# Patient Record
Sex: FEMALE | Race: WHITE | NOT HISPANIC OR LATINO | URBAN - METROPOLITAN AREA
[De-identification: names, ages, dates, MRNs, and addresses within clinical notes are randomized per-mention and may not be internally consistent; named-entity substitution may affect disease eponyms.]

---

## 2020-03-20 ENCOUNTER — EMERGENCY (EMERGENCY)
Facility: HOSPITAL | Age: 45
LOS: 1 days | Discharge: HOME | End: 2020-03-20
Attending: EMERGENCY MEDICINE
Payer: COMMERCIAL

## 2020-03-20 VITALS
SYSTOLIC BLOOD PRESSURE: 141 MMHG | HEIGHT: 60 IN | TEMPERATURE: 98 F | WEIGHT: 203.93 LBS | OXYGEN SATURATION: 96 % | HEART RATE: 86 BPM | RESPIRATION RATE: 18 BRPM | DIASTOLIC BLOOD PRESSURE: 91 MMHG

## 2020-03-20 LAB — RAPID RVP RESULT: SIGNIFICANT CHANGE UP

## 2020-03-20 PROCEDURE — 87486 CHLMYD PNEUM DNA AMP PROBE: CPT

## 2020-03-20 PROCEDURE — 99283 EMERGENCY DEPT VISIT LOW MDM: CPT

## 2020-03-20 PROCEDURE — 87798 DETECT AGENT NOS DNA AMP: CPT

## 2020-03-20 PROCEDURE — U0001: CPT

## 2020-03-20 PROCEDURE — 87633 RESP VIRUS 12-25 TARGETS: CPT

## 2020-03-20 PROCEDURE — 87581 M.PNEUMON DNA AMP PROBE: CPT

## 2020-03-20 PROCEDURE — 99284 EMERGENCY DEPT VISIT MOD MDM: CPT

## 2020-03-20 NOTE — ED STATDOCS - OBJECTIVE STATEMENT
43 y/o F pt with significant PMHx of DM and asthma presents to the ED c/o SOB and occasional cough that began yesterday. Pt states that she may have had positive contact with a coworker with positive COVID. Denies fever, sore throat, runny nose, NVD. 43 y/o F pt with significant PMHx of DM and asthma presents to the ED c/o mild SOB and occasional cough that began yesterday. Pt states that she may have had positive contact with a coworker with positive COVID. Denies fever, sore throat, runny nose, N/V/D. Reports compliance with advair and proventil (last used last night).

## 2020-03-20 NOTE — ED STATDOCS - CLINICAL SUMMARY MEDICAL DECISION MAKING FREE TEXT BOX
COVID R/O RVP and COVID swabs obtained.  Advised home quarantine until test results.  If test positive, requires home quarantine for 14-days.  Anticipatory guidance provided and supportive care recommended. Advised immediate return if worsening symptoms, especially if short of breath.

## 2020-03-20 NOTE — ED ADULT TRIAGE NOTE - CHIEF COMPLAINT QUOTE
pt states was exposed to COVID and is experiencing cough and shortness of breath. breathing spontaneous and unlabored in triage pt speaking clear full sentences

## 2020-03-20 NOTE — ED STATDOCS - NSFOLLOWUPINSTRUCTIONS_ED_ALL_ED_FT
READ ALL ATTACHED INSTRUCTIONS FOR CORONAVIRUS IMMEDIATELY   -You were tested for COVID19 (Coronavirus) during your visit in the Emergency Department.   -Results will take 5-7 days  -Do NOT return to work/school/public areas until your COVID test results as negative.   -SELF QUARANTINE until COVID result is available.   -Avoid contact with others.   -Wash your hands frequently. Disinfect surfaces frequently    SEEK IMMEDIATE MEDICAL CARE IF YOU HAVE ANY OF THE FOLLOWING SYMPTOMS  **If you develop worsening or new symptoms such as shortness of breath, difficulty breathing, chest pain, confusion, severe weakness, or anything concerning to you, please seek immediate medical care or return to the ER .**

## 2020-05-29 PROBLEM — J45.909 UNSPECIFIED ASTHMA, UNCOMPLICATED: Chronic | Status: ACTIVE | Noted: 2020-03-20

## 2020-08-26 ENCOUNTER — APPOINTMENT (OUTPATIENT)
Dept: INTERNAL MEDICINE | Facility: CLINIC | Age: 45
End: 2020-08-26
Payer: COMMERCIAL

## 2020-08-26 ENCOUNTER — NON-APPOINTMENT (OUTPATIENT)
Age: 45
End: 2020-08-26

## 2020-08-26 VITALS
TEMPERATURE: 98.1 F | BODY MASS INDEX: 40.05 KG/M2 | HEART RATE: 95 BPM | DIASTOLIC BLOOD PRESSURE: 90 MMHG | WEIGHT: 204 LBS | HEIGHT: 60 IN | OXYGEN SATURATION: 97 % | SYSTOLIC BLOOD PRESSURE: 120 MMHG

## 2020-08-26 DIAGNOSIS — Z23 ENCOUNTER FOR IMMUNIZATION: ICD-10-CM

## 2020-08-26 DIAGNOSIS — Z87.01 PERSONAL HISTORY OF PNEUMONIA (RECURRENT): ICD-10-CM

## 2020-08-26 DIAGNOSIS — Z86.69 PERSONAL HISTORY OF OTHER DISEASES OF THE NERVOUS SYSTEM AND SENSE ORGANS: ICD-10-CM

## 2020-08-26 DIAGNOSIS — Z11.59 ENCOUNTER FOR SCREENING FOR OTHER VIRAL DISEASES: ICD-10-CM

## 2020-08-26 DIAGNOSIS — Z78.9 OTHER SPECIFIED HEALTH STATUS: ICD-10-CM

## 2020-08-26 DIAGNOSIS — Z82.49 FAMILY HISTORY OF ISCHEMIC HEART DISEASE AND OTHER DISEASES OF THE CIRCULATORY SYSTEM: ICD-10-CM

## 2020-08-26 DIAGNOSIS — Z83.6 FAMILY HISTORY OF OTHER DISEASES OF THE RESPIRATORY SYSTEM: ICD-10-CM

## 2020-08-26 PROCEDURE — 82043 UR ALBUMIN QUANTITATIVE: CPT | Mod: QW

## 2020-08-26 PROCEDURE — 83036 HEMOGLOBIN GLYCOSYLATED A1C: CPT | Mod: QW

## 2020-08-26 PROCEDURE — 99204 OFFICE O/P NEW MOD 45 MIN: CPT | Mod: 25

## 2020-08-26 PROCEDURE — 36415 COLL VENOUS BLD VENIPUNCTURE: CPT

## 2020-08-26 PROCEDURE — 93000 ELECTROCARDIOGRAM COMPLETE: CPT

## 2020-08-26 PROCEDURE — 90686 IIV4 VACC NO PRSV 0.5 ML IM: CPT

## 2020-08-26 PROCEDURE — G0008: CPT

## 2020-08-26 PROCEDURE — 92552 PURE TONE AUDIOMETRY AIR: CPT

## 2020-08-28 LAB
24R-OH-CALCIDIOL SERPL-MCNC: 43.1 PG/ML
ALBUMIN SERPL ELPH-MCNC: 4.1 G/DL
ALBUMIN: 30
ALP BLD-CCNC: 70 U/L
ALT SERPL-CCNC: 14 U/L
ANION GAP SERPL CALC-SCNC: 11 MMOL/L
APPEARANCE: CLEAR
AST SERPL-CCNC: 12 U/L
BASOPHILS # BLD AUTO: 0.06 K/UL
BASOPHILS NFR BLD AUTO: 0.6 %
BILIRUB SERPL-MCNC: 0.3 MG/DL
BILIRUBIN URINE: NEGATIVE
BLOOD URINE: NEGATIVE
BUN SERPL-MCNC: 9 MG/DL
CALCIUM SERPL-MCNC: 9.4 MG/DL
CHLORIDE SERPL-SCNC: 103 MMOL/L
CHOLEST SERPL-MCNC: 158 MG/DL
CHOLEST/HDLC SERPL: 3.4 RATIO
CK SERPL-CCNC: 78 U/L
CO2 SERPL-SCNC: 24 MMOL/L
COLOR: YELLOW
CREAT SERPL-MCNC: 0.5 MG/DL
CREATININE: 200
EOSINOPHIL # BLD AUTO: 0.07 K/UL
EOSINOPHIL NFR BLD AUTO: 0.7 %
ESTIMATED AVERAGE GLUCOSE: 229 MG/DL
GLUCOSE QUALITATIVE U: ABNORMAL
GLUCOSE SERPL-MCNC: 221 MG/DL
HBA1C MFR BLD HPLC: 9.6 %
HCT VFR BLD CALC: 41.9 %
HDLC SERPL-MCNC: 47 MG/DL
HGB BLD-MCNC: 13.1 G/DL
IMM GRANULOCYTES NFR BLD AUTO: 0.2 %
KETONES URINE: NEGATIVE
LDLC SERPL CALC-MCNC: 86 MG/DL
LDLC SERPL DIRECT ASSAY-MCNC: 96 MG/DL
LEUKOCYTE ESTERASE URINE: NEGATIVE
LYMPHOCYTES # BLD AUTO: 3.17 K/UL
LYMPHOCYTES NFR BLD AUTO: 30.1 %
MAN DIFF?: NORMAL
MCHC RBC-ENTMCNC: 28.7 PG
MCHC RBC-ENTMCNC: 31.3 GM/DL
MCV RBC AUTO: 91.9 FL
MICROALBUMIN/CREAT UR TEST STR-RTO: <30
MONOCYTES # BLD AUTO: 0.47 K/UL
MONOCYTES NFR BLD AUTO: 4.5 %
NEUTROPHILS # BLD AUTO: 6.74 K/UL
NEUTROPHILS NFR BLD AUTO: 63.9 %
NITRITE URINE: NEGATIVE
PH URINE: 6
PLATELET # BLD AUTO: 334 K/UL
POTASSIUM SERPL-SCNC: 4.1 MMOL/L
PROT SERPL-MCNC: 6.9 G/DL
PROTEIN URINE: NORMAL
RBC # BLD: 4.56 M/UL
RBC # FLD: 14.1 %
SARS-COV-2 IGG SERPL IA-ACNC: <3.8 AU/ML
SARS-COV-2 IGG SERPL QL IA: NEGATIVE
SODIUM SERPL-SCNC: 138 MMOL/L
SPECIFIC GRAVITY URINE: 1.03
TRIGL SERPL-MCNC: 125 MG/DL
TSH SERPL-ACNC: 0.79 UIU/ML
URATE SERPL-MCNC: 3.4 MG/DL
UROBILINOGEN URINE: NORMAL
WBC # FLD AUTO: 10.53 K/UL

## 2020-09-08 ENCOUNTER — APPOINTMENT (OUTPATIENT)
Dept: INTERNAL MEDICINE | Facility: CLINIC | Age: 45
End: 2020-09-08
Payer: COMMERCIAL

## 2020-09-08 VITALS
DIASTOLIC BLOOD PRESSURE: 84 MMHG | WEIGHT: 206 LBS | TEMPERATURE: 97.8 F | HEIGHT: 60 IN | SYSTOLIC BLOOD PRESSURE: 126 MMHG | BODY MASS INDEX: 40.44 KG/M2 | OXYGEN SATURATION: 98 % | HEART RATE: 86 BPM

## 2020-09-08 PROCEDURE — 99214 OFFICE O/P EST MOD 30 MIN: CPT

## 2020-09-09 ENCOUNTER — EMERGENCY (EMERGENCY)
Facility: HOSPITAL | Age: 45
LOS: 1 days | Discharge: HOME | End: 2020-09-09
Attending: EMERGENCY MEDICINE
Payer: COMMERCIAL

## 2020-09-09 VITALS
RESPIRATION RATE: 16 BRPM | TEMPERATURE: 98 F | HEIGHT: 60 IN | DIASTOLIC BLOOD PRESSURE: 92 MMHG | SYSTOLIC BLOOD PRESSURE: 127 MMHG | WEIGHT: 220.02 LBS | HEART RATE: 89 BPM | OXYGEN SATURATION: 98 %

## 2020-09-09 VITALS
RESPIRATION RATE: 16 BRPM | HEART RATE: 83 BPM | OXYGEN SATURATION: 98 % | SYSTOLIC BLOOD PRESSURE: 142 MMHG | DIASTOLIC BLOOD PRESSURE: 82 MMHG

## 2020-09-09 LAB
ALBUMIN SERPL ELPH-MCNC: 3.6 G/DL — SIGNIFICANT CHANGE UP (ref 3.3–5.2)
ALP SERPL-CCNC: 67 U/L — SIGNIFICANT CHANGE UP (ref 40–120)
ALT FLD-CCNC: 14 U/L — SIGNIFICANT CHANGE UP
ANION GAP SERPL CALC-SCNC: 11 MMOL/L — SIGNIFICANT CHANGE UP (ref 5–17)
ANISOCYTOSIS BLD QL: SLIGHT — SIGNIFICANT CHANGE UP
APTT BLD: 33.8 SEC — SIGNIFICANT CHANGE UP (ref 27.5–35.5)
AST SERPL-CCNC: 22 U/L — SIGNIFICANT CHANGE UP
BASOPHILS # BLD AUTO: 0.04 K/UL — SIGNIFICANT CHANGE UP (ref 0–0.2)
BASOPHILS NFR BLD AUTO: 0.4 % — SIGNIFICANT CHANGE UP (ref 0–2)
BILIRUB SERPL-MCNC: 0.3 MG/DL — LOW (ref 0.4–2)
BUN SERPL-MCNC: 10 MG/DL — SIGNIFICANT CHANGE UP (ref 8–20)
CALCIUM SERPL-MCNC: 9 MG/DL — SIGNIFICANT CHANGE UP (ref 8.6–10.2)
CHLORIDE SERPL-SCNC: 102 MMOL/L — SIGNIFICANT CHANGE UP (ref 98–107)
CO2 SERPL-SCNC: 22 MMOL/L — SIGNIFICANT CHANGE UP (ref 22–29)
CREAT SERPL-MCNC: 0.44 MG/DL — LOW (ref 0.5–1.3)
DACRYOCYTES BLD QL SMEAR: SLIGHT — SIGNIFICANT CHANGE UP
ELLIPTOCYTES BLD QL SMEAR: SLIGHT — SIGNIFICANT CHANGE UP
EOSINOPHIL # BLD AUTO: 0.06 K/UL — SIGNIFICANT CHANGE UP (ref 0–0.5)
EOSINOPHIL NFR BLD AUTO: 0.6 % — SIGNIFICANT CHANGE UP (ref 0–6)
GIANT PLATELETS BLD QL SMEAR: PRESENT — SIGNIFICANT CHANGE UP
GLUCOSE SERPL-MCNC: 216 MG/DL — HIGH (ref 70–99)
HCG SERPL-ACNC: <4 MIU/ML — SIGNIFICANT CHANGE UP
HCT VFR BLD CALC: 36.3 % — SIGNIFICANT CHANGE UP (ref 34.5–45)
HCT VFR BLD CALC: 41 % — SIGNIFICANT CHANGE UP (ref 34.5–45)
HGB BLD-MCNC: 11.9 G/DL — SIGNIFICANT CHANGE UP (ref 11.5–15.5)
HGB BLD-MCNC: 13.4 G/DL — SIGNIFICANT CHANGE UP (ref 11.5–15.5)
HYPOCHROMIA BLD QL: SLIGHT — SIGNIFICANT CHANGE UP
IMM GRANULOCYTES NFR BLD AUTO: 0.2 % — SIGNIFICANT CHANGE UP (ref 0–1.5)
INR BLD: 0.99 RATIO — SIGNIFICANT CHANGE UP (ref 0.88–1.16)
LYMPHOCYTES # BLD AUTO: 2.51 K/UL — SIGNIFICANT CHANGE UP (ref 1–3.3)
LYMPHOCYTES # BLD AUTO: 26.3 % — SIGNIFICANT CHANGE UP (ref 13–44)
MACROCYTES BLD QL: SLIGHT — SIGNIFICANT CHANGE UP
MANUAL SMEAR VERIFICATION: SIGNIFICANT CHANGE UP
MCHC RBC-ENTMCNC: 29.2 PG — SIGNIFICANT CHANGE UP (ref 27–34)
MCHC RBC-ENTMCNC: 29.7 PG — SIGNIFICANT CHANGE UP (ref 27–34)
MCHC RBC-ENTMCNC: 32.7 GM/DL — SIGNIFICANT CHANGE UP (ref 32–36)
MCHC RBC-ENTMCNC: 32.8 GM/DL — SIGNIFICANT CHANGE UP (ref 32–36)
MCV RBC AUTO: 89.3 FL — SIGNIFICANT CHANGE UP (ref 80–100)
MCV RBC AUTO: 90.5 FL — SIGNIFICANT CHANGE UP (ref 80–100)
MICROCYTES BLD QL: SLIGHT — SIGNIFICANT CHANGE UP
MONOCYTES # BLD AUTO: 0.48 K/UL — SIGNIFICANT CHANGE UP (ref 0–0.9)
MONOCYTES NFR BLD AUTO: 5 % — SIGNIFICANT CHANGE UP (ref 2–14)
NEUTROPHILS # BLD AUTO: 6.44 K/UL — SIGNIFICANT CHANGE UP (ref 1.8–7.4)
NEUTROPHILS NFR BLD AUTO: 67.5 % — SIGNIFICANT CHANGE UP (ref 43–77)
OVALOCYTES BLD QL SMEAR: SLIGHT — SIGNIFICANT CHANGE UP
PLAT MORPH BLD: NORMAL — SIGNIFICANT CHANGE UP
PLATELET # BLD AUTO: 28 K/UL — LOW (ref 150–400)
PLATELET # BLD AUTO: 311 K/UL — SIGNIFICANT CHANGE UP (ref 150–400)
PLATELET COUNT - ESTIMATE: NORMAL — SIGNIFICANT CHANGE UP
POIKILOCYTOSIS BLD QL AUTO: SLIGHT — SIGNIFICANT CHANGE UP
POLYCHROMASIA BLD QL SMEAR: SLIGHT — SIGNIFICANT CHANGE UP
POTASSIUM SERPL-MCNC: 4.4 MMOL/L — SIGNIFICANT CHANGE UP (ref 3.5–5.3)
POTASSIUM SERPL-SCNC: 4.4 MMOL/L — SIGNIFICANT CHANGE UP (ref 3.5–5.3)
PROT SERPL-MCNC: 6.8 G/DL — SIGNIFICANT CHANGE UP (ref 6.6–8.7)
PROTHROM AB SERPL-ACNC: 11.5 SEC — SIGNIFICANT CHANGE UP (ref 10.6–13.6)
RBC # BLD: 4.01 M/UL — SIGNIFICANT CHANGE UP (ref 3.8–5.2)
RBC # BLD: 4.59 M/UL — SIGNIFICANT CHANGE UP (ref 3.8–5.2)
RBC # FLD: 13.4 % — SIGNIFICANT CHANGE UP (ref 10.3–14.5)
RBC # FLD: 13.5 % — SIGNIFICANT CHANGE UP (ref 10.3–14.5)
RBC BLD AUTO: NORMAL — SIGNIFICANT CHANGE UP
SODIUM SERPL-SCNC: 135 MMOL/L — SIGNIFICANT CHANGE UP (ref 135–145)
VARIANT LYMPHS # BLD: 2.6 % — SIGNIFICANT CHANGE UP (ref 0–6)
WBC # BLD: 5.31 K/UL — SIGNIFICANT CHANGE UP (ref 3.8–10.5)
WBC # BLD: 9.55 K/UL — SIGNIFICANT CHANGE UP (ref 3.8–10.5)
WBC # FLD AUTO: 5.31 K/UL — SIGNIFICANT CHANGE UP (ref 3.8–10.5)
WBC # FLD AUTO: 9.55 K/UL — SIGNIFICANT CHANGE UP (ref 3.8–10.5)

## 2020-09-09 PROCEDURE — 80053 COMPREHEN METABOLIC PANEL: CPT

## 2020-09-09 PROCEDURE — 99284 EMERGENCY DEPT VISIT MOD MDM: CPT | Mod: 25

## 2020-09-09 PROCEDURE — 70551 MRI BRAIN STEM W/O DYE: CPT

## 2020-09-09 PROCEDURE — 85730 THROMBOPLASTIN TIME PARTIAL: CPT

## 2020-09-09 PROCEDURE — 99285 EMERGENCY DEPT VISIT HI MDM: CPT

## 2020-09-09 PROCEDURE — 70544 MR ANGIOGRAPHY HEAD W/O DYE: CPT | Mod: 26,59

## 2020-09-09 PROCEDURE — 99223 1ST HOSP IP/OBS HIGH 75: CPT

## 2020-09-09 PROCEDURE — 85610 PROTHROMBIN TIME: CPT

## 2020-09-09 PROCEDURE — 85025 COMPLETE CBC W/AUTO DIFF WBC: CPT

## 2020-09-09 PROCEDURE — 36415 COLL VENOUS BLD VENIPUNCTURE: CPT

## 2020-09-09 PROCEDURE — 84702 CHORIONIC GONADOTROPIN TEST: CPT

## 2020-09-09 PROCEDURE — 70551 MRI BRAIN STEM W/O DYE: CPT | Mod: 26

## 2020-09-09 PROCEDURE — 70544 MR ANGIOGRAPHY HEAD W/O DYE: CPT

## 2020-09-09 PROCEDURE — 85027 COMPLETE CBC AUTOMATED: CPT

## 2020-09-09 RX ORDER — CETIRIZINE HYDROCHLORIDE 10 MG/1
1 TABLET ORAL
Qty: 0 | Refills: 0 | DISCHARGE

## 2020-09-09 RX ORDER — METFORMIN HYDROCHLORIDE 850 MG/1
1 TABLET ORAL
Qty: 0 | Refills: 0 | DISCHARGE

## 2020-09-09 RX ORDER — CHOLECALCIFEROL (VITAMIN D3) 125 MCG
1 CAPSULE ORAL
Qty: 0 | Refills: 0 | DISCHARGE

## 2020-09-09 RX ORDER — FLUTICASONE PROPIONATE AND SALMETEROL 50; 250 UG/1; UG/1
2 POWDER ORAL; RESPIRATORY (INHALATION)
Qty: 0 | Refills: 0 | DISCHARGE

## 2020-09-09 NOTE — ED PROVIDER NOTE - NSFOLLOWUPINSTRUCTIONS_ED_ALL_ED_FT
Conversation had with patient regarding results of testing. Patient agrees with plan for discharge at this time. Patient agrees to comply with follow up with PCP. Return to ED precautions and discharge instructions given to patient.

## 2020-09-09 NOTE — ED PROVIDER NOTE - PHYSICAL EXAMINATION
General: NAD, well appearing  HEENT: Normocephalic, EOM intact, PEERLA, 20/20 both eyes, peripheral vision intact, no nystagmus, no obvious strabismus.  Neck: No apparent stiffness or JVD  Pulm: Chest wall symmetric and nontender, lungs clear to ascultation   Cardiac: Regular rate and rhythm  Abdomen: Nontender and nondistended  Skin: Skin in warm, dry and intact without rashes or lesions.  Neuro: No apparent motor or sensory deficits  Psych: Alert, oriented, and cooperative

## 2020-09-09 NOTE — ED PROVIDER NOTE - OBJECTIVE STATEMENT
45F with DM2, asthma, obesity presents with new-onset double vision with both eyes open starting 2 days ago with no dizziness, LH, HA, eye pain, or any other symptoms.  Patient states it happens when she tries to focus on central objects.  Otherwise denies pain, bleeding, SOB, chest pain, abdominal pain or fevers.  Denies other pertinent medical problems.  Denies any overt substance use.

## 2020-09-09 NOTE — ED ADULT NURSE NOTE - OBJECTIVE STATEMENT
Pt c/o intermittent vision loss/double vision since Sunday.  Pt reports worsening of symptoms this morning.  Reports one episode of dizziness on Monday.  Currently denies dizziness, numbness, tingling, nausea, vomiting, diarrhea.  Denies recent illness.  Pt has hx of bells palsy and has residual left sided facial weakness, droop.  As per pt and family at bedside pt is at baseline at this time.  Upon MD exam pt did not display vision changes but states a sign posted approx 15 feet away appears double.  Pt HE, equal strength, sensation bilaterally.

## 2020-09-09 NOTE — CONSULT NOTE ADULT - ASSESSMENT
The patient is a 45y Female who is followed by neurology because of diplopia    Diplopia (vertical)  Has left esotropia ?chronicity  no clear neurologic cause  MRI/A head were normal    Agree with ophthalmology evaluation    discussed with patient and her sister    Thank you for allowing me to participate in the care of your patient    Rod Silveira MD, PhD   [id number]

## 2020-09-09 NOTE — ED PROVIDER NOTE - PROGRESS NOTE DETAILS
MR, MRA Head normal. Workup unremarkable. Patient is feeling improved. Return precautions given.  Follow up with opthalmology (Dr. Macdonald) today.  All questions answered and patient agreeable to the plan.

## 2020-09-09 NOTE — CONSULT NOTE ADULT - SUBJECTIVE AND OBJECTIVE BOX
[facility]                                     [facility] [place]                                 Jordana Greenberg, & Cj                                  [address]                                        [place], [place], [address]                                             [phone]    CC: diplopia  HPI: The patient is a 45y Female who presented with intermittent vertical diplopia that started Sunday.  She says that it lasts for a few minutes at atoime.  She has experienced unilateral retroorbital headaches after a few of these episodes.  She has no other neurological symptoms with these episodes.  She has history of migraines and [place] palsy in past.  neurology is asked to evaluate.    PAST MEDICAL & SURGICAL HISTORY:  Bell's palsy  DM (diabetes mellitus)  Asthma      MEDICATIONS  (STANDING):    MEDICATIONS  (PRN):      Allergies    azithromycin (Rash)  Ceclor (Rash)    Intolerances        SOCIAL HISTORY:  no tob,   no alcohol   no drugs    FAMILY HISTORY:  no migraine in either parent      ROS: 14 point ROS negative other than what is present in HPI or below  diplopia as above    Vital Signs Last 24 Hrs  T(C): 36.8 (09 Sep 2020 08:28), Max: 36.8 (09 Sep 2020 08:28)  T(F): 98.2 (09 Sep 2020 08:28), Max: 98.2 (09 Sep 2020 08:28)  HR: 83 (09 Sep 2020 10:45) (83 - 89)  BP: 142/82 (09 Sep 2020 10:45) (127/92 - 142/82)  BP(mean): --  RR: 16 (09 Sep 2020 10:45) (16 - 16)  SpO2: 98% (09 Sep 2020 10:45) (98% - 98%)      General: NAD    Detailed Neurologic Exam:    Mental status: The patient is awake and alert and has normal attention span.  The patient is fully oriented in 3 spheres. The patient is oriented to current events. The patient is able to name objects, follow commands, repeat sentences.    Cranial nerves: Pupils equal and react symmetrically to light. There is no visual field deficit to confrontation. Extraocular motion is full with no nystagmus ? left esotropia. There is no ptosis. Facial sensation is intact. Facial musculature is asymmetric, left sided lower motor neuron weakness. Palate elevates symmetrically. Shoulder shrug is normal. Tongue is midline.    Motor: There is normal bulk and tone.  There is no tremor.  Strength is 5/5 in the right arm and leg.   Strength is 5/5 in the left arm and leg.    Sensation: Intact to light touch and pin in 4 extremities    Reflexes: 2+ throughout and plantar responses are flexor.    Cerebellar: There is no dysmetria on finger to nose testing.    Gait : deferred    LABS:       09-09    135  |  102  |  10.0  ----------------------------<  216<H>  4.4   |  22.0  |  0.44<L>    Ca    9.0      09 Sep 2020 10:20    TPro  6.8  /  Alb  3.6  /  TBili  0.3<L>  /  DBili  x   /  AST  22  /  ALT  14  /  AlkPhos  67  09-09      RADIOLOGY & ADDITIONAL STUDIES (independently reviewed unless otherwise noted):  MRI brain- no acute CVA, mass or bleed, no significant SVID  MRA head- no aneurysm, AVM, LVO or significant stenosis

## 2020-09-09 NOTE — ED PROVIDER NOTE - NS ED ROS FT
General: No fever, no massive bleeding  H/N: no neck pain, no sore throat  Resp: No SOB, no hemoptysis  Cardiovascular: No CP, No LOC  GI: No abdominal pain, No blood in stool  : No dysuria, no hematuria   MSK: No back pain, no limb pain  Neuro: No HA, No focal deficits

## 2020-09-09 NOTE — ED ADULT NURSE NOTE - CHIEF COMPLAINT QUOTE
Pt states has been getting HA's with reading and on Sunday night pt experienced "double vision" that was intermittent but has been worsening since. Pt denies any gait disturbances and denies any loss of strength and/or visual disturbance at time of triage. Pt speaking coherently and following commands. Pt scheduled for Optho today.

## 2020-09-09 NOTE — ED PROVIDER NOTE - CLINICAL SUMMARY MEDICAL DECISION MAKING FREE TEXT BOX
45F with DM2, asthma, obesity presents with new-onset double vision starting 2 days ago with no dizziness, LH, HA, eye pain, or any other symptoms.  MRI/MRA labs.

## 2020-09-09 NOTE — ED PROVIDER NOTE - ATTENDING CONTRIBUTION TO CARE
I personally saw the patient with the resident, and completed the key components of the history and physical exam. I then discussed the management plan with the resident.      44 yo female pmh dm, asthma comes to ed with 2 days of intermittent double vision/blurry when she was at [employer]; pt denies headache, nausea or vomiting; pt seen by pmd and sent for mri; pt's neurology is [facility] for work up of [place];   pe awake alert in nad heent rt eye with lag  to rt;  pt able to read digits; no loss of peripheral vision   dx visual disturbance; eval mass, bleed, electrolyte;

## 2020-09-09 NOTE — ED PROVIDER NOTE - PATIENT PORTAL LINK FT
You can access the FollowMyHealth Patient Portal offered by [facility] by registering at the following website: [email or web address]. By joining [facility]’s FollowMyHealth portal, you will also be able to view your health information using other applications (apps) compatible with our system.

## 2020-09-17 ENCOUNTER — APPOINTMENT (OUTPATIENT)
Dept: INTERNAL MEDICINE | Facility: CLINIC | Age: 45
End: 2020-09-17
Payer: COMMERCIAL

## 2020-09-17 VITALS
TEMPERATURE: 98.2 F | SYSTOLIC BLOOD PRESSURE: 130 MMHG | RESPIRATION RATE: 16 BRPM | DIASTOLIC BLOOD PRESSURE: 80 MMHG | WEIGHT: 206 LBS | BODY MASS INDEX: 40.23 KG/M2 | HEART RATE: 94 BPM | OXYGEN SATURATION: 98 %

## 2020-09-17 PROCEDURE — 99213 OFFICE O/P EST LOW 20 MIN: CPT | Mod: 25

## 2020-09-17 PROCEDURE — 82962 GLUCOSE BLOOD TEST: CPT

## 2020-09-18 PROBLEM — E11.9 TYPE 2 DIABETES MELLITUS WITHOUT COMPLICATIONS: Chronic | Status: ACTIVE | Noted: 2020-09-09

## 2020-09-18 PROBLEM — G51.0 BELL'S PALSY: Chronic | Status: ACTIVE | Noted: 2020-09-09

## 2020-09-18 LAB — GLUCOSE BLDC GLUCOMTR-MCNC: 196

## 2020-09-28 ENCOUNTER — APPOINTMENT (OUTPATIENT)
Dept: INTERNAL MEDICINE | Facility: CLINIC | Age: 45
End: 2020-09-28

## 2020-10-21 ENCOUNTER — APPOINTMENT (OUTPATIENT)
Dept: INTERNAL MEDICINE | Facility: CLINIC | Age: 45
End: 2020-10-21
Payer: COMMERCIAL

## 2020-10-21 VITALS
HEART RATE: 96 BPM | WEIGHT: 208 LBS | BODY MASS INDEX: 40.84 KG/M2 | SYSTOLIC BLOOD PRESSURE: 120 MMHG | TEMPERATURE: 98 F | OXYGEN SATURATION: 98 % | HEIGHT: 60 IN | DIASTOLIC BLOOD PRESSURE: 80 MMHG

## 2020-10-21 PROCEDURE — 99072 ADDL SUPL MATRL&STAF TM PHE: CPT

## 2020-10-21 PROCEDURE — 83036 HEMOGLOBIN GLYCOSYLATED A1C: CPT | Mod: QW

## 2020-10-21 PROCEDURE — 99214 OFFICE O/P EST MOD 30 MIN: CPT | Mod: 25

## 2020-10-21 PROCEDURE — 82962 GLUCOSE BLOOD TEST: CPT

## 2020-10-23 ENCOUNTER — NON-APPOINTMENT (OUTPATIENT)
Age: 45
End: 2020-10-23

## 2020-10-23 LAB
GLUCOSE BLDC GLUCOMTR-MCNC: 231
HBA1C MFR BLD HPLC: 8.8

## 2020-12-05 ENCOUNTER — RX RENEWAL (OUTPATIENT)
Age: 45
End: 2020-12-05

## 2021-01-20 ENCOUNTER — APPOINTMENT (OUTPATIENT)
Dept: INTERNAL MEDICINE | Facility: CLINIC | Age: 46
End: 2021-01-20
Payer: COMMERCIAL

## 2021-01-20 ENCOUNTER — NON-APPOINTMENT (OUTPATIENT)
Age: 46
End: 2021-01-20

## 2021-01-20 VITALS
WEIGHT: 206 LBS | OXYGEN SATURATION: 97 % | SYSTOLIC BLOOD PRESSURE: 110 MMHG | HEIGHT: 60 IN | HEART RATE: 94 BPM | TEMPERATURE: 97.6 F | BODY MASS INDEX: 40.44 KG/M2 | DIASTOLIC BLOOD PRESSURE: 80 MMHG

## 2021-01-20 PROCEDURE — 82962 GLUCOSE BLOOD TEST: CPT

## 2021-01-20 PROCEDURE — 83036 HEMOGLOBIN GLYCOSYLATED A1C: CPT | Mod: QW

## 2021-01-20 PROCEDURE — 99214 OFFICE O/P EST MOD 30 MIN: CPT | Mod: 25

## 2021-01-20 PROCEDURE — 99072 ADDL SUPL MATRL&STAF TM PHE: CPT

## 2021-01-20 RX ORDER — METFORMIN HYDROCHLORIDE 500 MG/1
500 TABLET, COATED ORAL
Qty: 60 | Refills: 0 | Status: COMPLETED | COMMUNITY
Start: 2020-08-26

## 2021-01-21 ENCOUNTER — NON-APPOINTMENT (OUTPATIENT)
Age: 46
End: 2021-01-21

## 2021-01-21 LAB
GLUCOSE BLDC GLUCOMTR-MCNC: 206
HBA1C MFR BLD HPLC: 8.8

## 2021-03-08 ENCOUNTER — APPOINTMENT (OUTPATIENT)
Dept: INTERNAL MEDICINE | Facility: CLINIC | Age: 46
End: 2021-03-08
Payer: COMMERCIAL

## 2021-03-08 PROCEDURE — 99213 OFFICE O/P EST LOW 20 MIN: CPT

## 2021-03-08 PROCEDURE — 99072 ADDL SUPL MATRL&STAF TM PHE: CPT

## 2021-03-08 NOTE — HISTORY OF PRESENT ILLNESS
[FreeTextEntry1] : f/u to DM, [de-identified] : Pt reports that she is not following the diet as well as she should. The A1c at the last visit was 8.8 which has not moved from the prior test but also is better then the original a1c which was 9.6. She is currently on Metformin 1000 mg bid which doesn't appear to be enough to control the BS.

## 2021-03-08 NOTE — ASSESSMENT
[FreeTextEntry1] : Pt has improved in the diabetes control however she is still not where she should be and will continue the metformin and add amaryl glimepiride to the mix and try to better control the BS.  She will start on 1 mg po in the am with breakfast.

## 2021-03-08 NOTE — PHYSICAL EXAM
[No Acute Distress] : no acute distress [Normal Sclera/Conjunctiva] : normal sclera/conjunctiva [Normal Outer Ear/Nose] : the outer ears and nose were normal in appearance [No JVD] : no jugular venous distention [No Respiratory Distress] : no respiratory distress  [Normal Rate] : normal rate

## 2021-09-14 ENCOUNTER — RX RENEWAL (OUTPATIENT)
Age: 46
End: 2021-09-14

## 2021-10-08 ENCOUNTER — APPOINTMENT (OUTPATIENT)
Dept: INTERNAL MEDICINE | Facility: CLINIC | Age: 46
End: 2021-10-08
Payer: COMMERCIAL

## 2021-10-08 VITALS
TEMPERATURE: 97.9 F | HEIGHT: 60 IN | BODY MASS INDEX: 43.19 KG/M2 | HEART RATE: 101 BPM | SYSTOLIC BLOOD PRESSURE: 130 MMHG | DIASTOLIC BLOOD PRESSURE: 90 MMHG | OXYGEN SATURATION: 98 % | WEIGHT: 220 LBS

## 2021-10-08 PROCEDURE — 99214 OFFICE O/P EST MOD 30 MIN: CPT | Mod: 25

## 2021-10-08 PROCEDURE — 82962 GLUCOSE BLOOD TEST: CPT

## 2021-10-08 PROCEDURE — 83036 HEMOGLOBIN GLYCOSYLATED A1C: CPT | Mod: QW

## 2021-10-09 LAB — GLUCOSE BLDC GLUCOMTR-MCNC: 99

## 2021-10-11 ENCOUNTER — NON-APPOINTMENT (OUTPATIENT)
Age: 46
End: 2021-10-11

## 2021-10-11 LAB — HBA1C MFR BLD HPLC: 7

## 2021-11-16 ENCOUNTER — RX RENEWAL (OUTPATIENT)
Age: 46
End: 2021-11-16

## 2021-12-15 ENCOUNTER — RX RENEWAL (OUTPATIENT)
Age: 46
End: 2021-12-15

## 2021-12-27 ENCOUNTER — APPOINTMENT (OUTPATIENT)
Dept: INTERNAL MEDICINE | Facility: CLINIC | Age: 46
End: 2021-12-27
Payer: COMMERCIAL

## 2021-12-27 VITALS
OXYGEN SATURATION: 96 % | DIASTOLIC BLOOD PRESSURE: 86 MMHG | HEART RATE: 82 BPM | TEMPERATURE: 98 F | SYSTOLIC BLOOD PRESSURE: 140 MMHG | BODY MASS INDEX: 44.76 KG/M2 | WEIGHT: 228 LBS | HEIGHT: 60 IN

## 2021-12-27 PROCEDURE — 99214 OFFICE O/P EST MOD 30 MIN: CPT

## 2022-02-07 ENCOUNTER — APPOINTMENT (OUTPATIENT)
Dept: INTERNAL MEDICINE | Facility: CLINIC | Age: 47
End: 2022-02-07

## 2022-03-18 ENCOUNTER — NON-APPOINTMENT (OUTPATIENT)
Age: 47
End: 2022-03-18

## 2022-03-18 ENCOUNTER — LABORATORY RESULT (OUTPATIENT)
Age: 47
End: 2022-03-18

## 2022-03-18 ENCOUNTER — APPOINTMENT (OUTPATIENT)
Dept: FAMILY MEDICINE | Facility: CLINIC | Age: 47
End: 2022-03-18
Payer: COMMERCIAL

## 2022-03-18 VITALS
HEART RATE: 84 BPM | SYSTOLIC BLOOD PRESSURE: 130 MMHG | RESPIRATION RATE: 16 BRPM | DIASTOLIC BLOOD PRESSURE: 84 MMHG | TEMPERATURE: 97.5 F | WEIGHT: 234 LBS | OXYGEN SATURATION: 98 % | BODY MASS INDEX: 45.94 KG/M2 | HEIGHT: 60 IN

## 2022-03-18 DIAGNOSIS — I34.1 NONRHEUMATIC MITRAL (VALVE) PROLAPSE: ICD-10-CM

## 2022-03-18 DIAGNOSIS — Z12.31 ENCOUNTER FOR SCREENING MAMMOGRAM FOR MALIGNANT NEOPLASM OF BREAST: ICD-10-CM

## 2022-03-18 PROCEDURE — 99396 PREV VISIT EST AGE 40-64: CPT | Mod: 25

## 2022-03-18 PROCEDURE — 36415 COLL VENOUS BLD VENIPUNCTURE: CPT

## 2022-03-18 PROCEDURE — 99214 OFFICE O/P EST MOD 30 MIN: CPT | Mod: 25

## 2022-03-23 NOTE — HISTORY OF PRESENT ILLNESS
[FreeTextEntry1] : NP to establish care [de-identified] : Serene presents today to establish care being referred to me by her sister ELSIE David RN at [facility] \par \par She  is an affable 46 year old  with PMH significant for Asthma since age 12 ; recalls admission to hospital in 199 ears ago) 2; ; Russell palsy x 2 endorses Left side of face never fully resolved; some loss of felling and facial droop ( MILD) ; Obesity and  DM  ( 5 years ago) .\par \par PSH noted for  NONE\par \par Denies any recent ER/UC visits; no hospitalizations /MVA's or NEW MSK injuries. 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

## 2022-03-23 NOTE — ASSESSMENT
[FreeTextEntry1] : Annual/ NP  exam for 46  year old  female with PMH as stated in HPI / active list. \par \par Management : \par \par weight loss strategies discussed\par \par Strategies  to earn steps in work ace environment reviewed. \par \par Advised dental exam and plan.\par \par MAMMO!! \par \par See HPI and Plan\par \par Labs in office today.   Will advise. \par \par Best wishes offered !\par \par \par \par

## 2022-03-23 NOTE — REVIEW OF SYSTEMS
[Negative] : Musculoskeletal [Fatigue] : no fatigue [Redness] : no redness [Itching] : no itching [Chest Pain] : no chest pain [Palpitations] : no palpitations [Wheezing] : no wheezing [Cough] : no cough [Headache] : no headache [Dizziness] : no dizziness [Anxiety] : no anxiety [Depression] : no depression [FreeTextEntry3] : eyeglasses

## 2022-03-23 NOTE — PHYSICAL EXAM
[Well-Appearing] : well-appearing [Normal Sclera/Conjunctiva] : normal sclera/conjunctiva [PERRL] : pupils equal round and reactive to light [No Lymphadenopathy] : no lymphadenopathy [Thyroid Normal, No Nodules] : the thyroid was normal and there were no nodules present [No Respiratory Distress] : no respiratory distress  [No Accessory Muscle Use] : no accessory muscle use [Clear to Auscultation] : lungs were clear to auscultation bilaterally [Normal Rate] : normal rate  [Regular Rhythm] : with a regular rhythm [No Murmur] : no murmur heard [Soft] : abdomen soft [Non Tender] : non-tender [Normal Supraclavicular Nodes] : no supraclavicular lymphadenopathy [Normal Posterior Cervical Nodes] : no posterior cervical lymphadenopathy [Normal Anterior Cervical Nodes] : no anterior cervical lymphadenopathy [No Spinal Tenderness] : no spinal tenderness [No Focal Deficits] : no focal deficits [Normal Gait] : normal gait [Speech Grossly Normal] : speech grossly normal [Alert and Oriented x3] : oriented to person, place, and time [Kyphosis] : no kyphosis [de-identified] : engaging    [de-identified] : OMM  mild facial asymmetry   POOR dentiton  [de-identified] : obese [de-identified] : no temors no pronator drift

## 2022-03-23 NOTE — HEALTH RISK ASSESSMENT
[Never] : Never [No] : In the past 12 months have you used drugs other than those required for medical reasons? No [0] : 2) Feeling down, depressed, or hopeless: Not at all (0) [PHQ-2 Negative - No further assessment needed] : PHQ-2 Negative - No further assessment needed [Audit-CScore] : 0 [[id number]] : 0

## 2022-03-29 LAB
ALBUMIN SERPL ELPH-MCNC: 3.8 G/DL
ALP BLD-CCNC: 69 U/L
ALT SERPL-CCNC: 18 U/L
ANION GAP SERPL CALC-SCNC: 14 MMOL/L
APPEARANCE: ABNORMAL
AST SERPL-CCNC: 20 U/L
BASOPHILS # BLD AUTO: 0.07 K/UL
BASOPHILS NFR BLD AUTO: 0.7 %
BILIRUB SERPL-MCNC: 0.2 MG/DL
BILIRUBIN URINE: NEGATIVE
BLOOD URINE: ABNORMAL
BUN SERPL-MCNC: 9 MG/DL
CALCIUM SERPL-MCNC: 9.1 MG/DL
CHLORIDE SERPL-SCNC: 104 MMOL/L
CHOLEST SERPL-MCNC: 170 MG/DL
CO2 SERPL-SCNC: 21 MMOL/L
COLOR: ABNORMAL
CREAT SERPL-MCNC: 0.57 MG/DL
CREAT SPEC-SCNC: 141 MG/DL
EGFR: 113 ML/MIN/1.73M2
EOSINOPHIL # BLD AUTO: 0.16 K/UL
EOSINOPHIL NFR BLD AUTO: 1.7 %
ESTIMATED AVERAGE GLUCOSE: 177 MG/DL
GLUCOSE QUALITATIVE U: ABNORMAL
GLUCOSE SERPL-MCNC: 149 MG/DL
HBA1C MFR BLD HPLC: 7.8 %
HCT VFR BLD CALC: 39 %
HDLC SERPL-MCNC: 36 MG/DL
HGB BLD-MCNC: 12.1 G/DL
IMM GRANULOCYTES NFR BLD AUTO: 0.2 %
KETONES URINE: NEGATIVE
LDLC SERPL CALC-MCNC: 90 MG/DL
LEUKOCYTE ESTERASE URINE: ABNORMAL
LYMPHOCYTES # BLD AUTO: 2.84 K/UL
LYMPHOCYTES NFR BLD AUTO: 29.9 %
MAN DIFF?: NORMAL
MCHC RBC-ENTMCNC: 26.8 PG
MCHC RBC-ENTMCNC: 31 GM/DL
MCV RBC AUTO: 86.5 FL
MICROALBUMIN 24H UR DL<=1MG/L-MCNC: 7.4 MG/DL
MICROALBUMIN/CREAT 24H UR-RTO: 53 MG/G
MONOCYTES # BLD AUTO: 0.52 K/UL
MONOCYTES NFR BLD AUTO: 5.5 %
NEUTROPHILS # BLD AUTO: 5.9 K/UL
NEUTROPHILS NFR BLD AUTO: 62 %
NITRITE URINE: NEGATIVE
NONHDLC SERPL-MCNC: 133 MG/DL
PH URINE: 6
PLATELET # BLD AUTO: 353 K/UL
POTASSIUM SERPL-SCNC: 4.4 MMOL/L
PROT SERPL-MCNC: 6.8 G/DL
PROTEIN URINE: ABNORMAL
RBC # BLD: 4.51 M/UL
RBC # FLD: 15 %
SODIUM SERPL-SCNC: 139 MMOL/L
SPECIFIC GRAVITY URINE: 1.02
TRIGL SERPL-MCNC: 214 MG/DL
TSH SERPL-ACNC: 1.07 UIU/ML
UROBILINOGEN URINE: NORMAL
WBC # FLD AUTO: 9.51 K/UL

## 2022-03-31 ENCOUNTER — APPOINTMENT (OUTPATIENT)
Dept: FAMILY MEDICINE | Facility: CLINIC | Age: 47
End: 2022-03-31
Payer: COMMERCIAL

## 2022-03-31 VITALS
HEIGHT: 60 IN | BODY MASS INDEX: 45.16 KG/M2 | SYSTOLIC BLOOD PRESSURE: 130 MMHG | OXYGEN SATURATION: 95 % | HEART RATE: 94 BPM | DIASTOLIC BLOOD PRESSURE: 80 MMHG | WEIGHT: 230 LBS

## 2022-03-31 PROCEDURE — 99214 OFFICE O/P EST MOD 30 MIN: CPT

## 2022-03-31 RX ORDER — GLIMEPIRIDE 1 MG/1
1 TABLET ORAL DAILY
Qty: 30 | Refills: 5 | Status: DISCONTINUED | COMMUNITY
Start: 2021-03-08 | End: 2022-03-31

## 2022-04-03 NOTE — PHYSICAL EXAM
[No Acute Distress] : no acute distress [Normal Sclera/Conjunctiva] : normal sclera/conjunctiva [No JVD] : no jugular venous distention [No Respiratory Distress] : no respiratory distress  [Normal Rate] : normal rate  [No Edema] : there was no peripheral edema [No Focal Deficits] : no focal deficits [Speech Grossly Normal] : speech grossly normal [Alert and Oriented x3] : oriented to person, place, and time [de-identified] : calm and engaging

## 2022-04-03 NOTE — HISTORY OF PRESENT ILLNESS
[FreeTextEntry1] : FU\par Last seen 3/18/22 for CPE, encounter reviewed.  [de-identified] : KOFI REILLY is a 46 year old F who presents today for a follow up visit regarding recent lab work.  \par \par A1C 7.8% from labs completed 3/18/22.\par \par Normal kidney, normal liver, normal thyroid functions.  \par \par Pt states she takes metformin twice a day "most of the time, sometimes I forget." \par \par

## 2022-04-03 NOTE — ASSESSMENT
[FreeTextEntry1] : FU for 46 year old WF with PMH as stated in HPI / active list. \par \par Management : \par \par See HPI and Plan.\par \par Glimepiride regimen to be halted, Januvia regimen to be initiated.  Increase Metformin intake to 3 times a day from BID.\par \par Advised to attempt weight loss through improved  eating habits by avoiding fast foods/processed foods/ junk food .\par Increase vegetable and salads. \par Encouraged to consider  joining [employer] if BMI > 25. \par Advised to take stairs at work.  Park car as far as possible when arriving at work in the morning. Importance of daily 30 minute walk stressed for exercise. \par \par Advised follow up with dentist.  \par \par GOAL A1C is 6.5%, currently 7.8\par \par Best wishes offered!

## 2022-04-03 NOTE — ADDENDUM
[FreeTextEntry1] : Medical record entries made by the scribe today, were at my direction and personally dictated to them by me, Dr. Sierra Dumas on 03/31/2022.  I have reviewed the chart and agree that the record accurately reflects my personal performance of the history, physical exam, assessment and plan.\par \par Fermin ALBARADO acting as scribe for Dr. Sierra Dumas MD on 03/31/2022 at 10:12 AM.\par

## 2022-04-03 NOTE — REVIEW OF SYSTEMS
[Negative] : Psychiatric [Fever] : no fever [Chills] : no chills [Night Sweats] : no night sweats [FreeTextEntry3] : eyeglasses

## 2022-04-11 PROBLEM — Z11.59 SCREENING FOR VIRAL DISEASE: Status: ACTIVE | Noted: 2020-08-26

## 2022-04-14 ENCOUNTER — TRANSCRIPTION ENCOUNTER (OUTPATIENT)
Age: 47
End: 2022-04-14

## 2022-05-09 ENCOUNTER — APPOINTMENT (OUTPATIENT)
Dept: INTERNAL MEDICINE | Facility: CLINIC | Age: 47
End: 2022-05-09

## 2022-07-21 ENCOUNTER — EMERGENCY (EMERGENCY)
Facility: HOSPITAL | Age: 47
LOS: 1 days | Discharge: HOME | End: 2022-07-21
Attending: EMERGENCY MEDICINE
Payer: COMMERCIAL

## 2022-07-21 VITALS
WEIGHT: 199.96 LBS | OXYGEN SATURATION: 98 % | HEART RATE: 78 BPM | HEIGHT: 60 IN | TEMPERATURE: 99 F | RESPIRATION RATE: 16 BRPM | DIASTOLIC BLOOD PRESSURE: 65 MMHG | SYSTOLIC BLOOD PRESSURE: 116 MMHG

## 2022-07-21 PROCEDURE — 99283 EMERGENCY DEPT VISIT LOW MDM: CPT

## 2022-07-21 PROCEDURE — 99282 EMERGENCY DEPT VISIT SF MDM: CPT

## 2022-07-21 RX ORDER — AZTREONAM 2 G
1 VIAL (EA) INJECTION
Qty: 14 | Refills: 0
Start: 2022-07-21 | End: 2022-07-27

## 2022-07-29 NOTE — ED PROVIDER NOTE - PATIENT PORTAL LINK FT
You can access the FollowMyHealth Patient Portal offered by [facility] by registering at the following website: [email or web address]. By joining [facility]’s FollowMyHealth portal, you will also be able to view your health information using other applications (apps) compatible with our system.

## 2022-07-29 NOTE — ED PROVIDER NOTE - OBJECTIVE STATEMENT
48 yo female pmh dm comes to ed with laceration fo  left 4th digit ;  pt noted bleeding contiued even after pressure; denieis any other trauma;

## 2022-08-18 ENCOUNTER — APPOINTMENT (OUTPATIENT)
Dept: FAMILY MEDICINE | Facility: CLINIC | Age: 47
End: 2022-08-18

## 2022-08-18 VITALS
BODY MASS INDEX: 44.17 KG/M2 | WEIGHT: 225 LBS | HEART RATE: 102 BPM | HEIGHT: 60 IN | OXYGEN SATURATION: 96 % | SYSTOLIC BLOOD PRESSURE: 134 MMHG | DIASTOLIC BLOOD PRESSURE: 82 MMHG

## 2022-08-18 PROCEDURE — 99214 OFFICE O/P EST MOD 30 MIN: CPT

## 2022-08-21 NOTE — HISTORY OF PRESENT ILLNESS
[FreeTextEntry1] : Last seen in office March 2022 for same, encounter reviewed. \par \par Presents for FUV on chronic medical conditions of: asthma, DM\par \par Requesting medication refills of: metformin, Januvia, albuterol, Flonase-Salmeterol \par \par In recent weeks KOFI endorses:\par \par A 1 C 7.8% in March 2022. \par \par Pt states that Januvia was not present at pharmacy.  \par \par Regarding asthma, pt states that she experienced some SOB in the recent weeks and is requesting medication refills, states attacks are triggered by smelling excessive amounts of bleach.   [de-identified] : In review March 2022:\par \par FU\par Last seen 3/18/22 for CPE, encounter reviewed. \par \par KOFI REILLY is a 46 year old F who presents today for a follow up visit regarding recent lab work. \par \par A1C 7.8% from labs completed 3/18/22.\par \par Normal kidney, normal liver, normal thyroid functions. \par \par Pt states she takes metformin twice a day "most of the time, sometimes I forget."

## 2022-08-21 NOTE — ASSESSMENT
[FreeTextEntry1] : FUV for 47 year old WF with PMH as stated in HPI / active list. \par \par Management : \par \par See HPI and Plan.\par \par Labs in office today, will advise. \par A 1 C 7.8% March 2022\par \par Refill(s) provided for:\par Metformin \par Albuterol\par Fluticasone-Salmetrol\par Januvia\par \par Continue current medication regimens. Continue healthy lifestyle choices!\par  \par Importance of daily 30 minute walk stressed for exercise. Importance of drinking 60-80 oz of FREE WATER daily stressed. \par \par Best wishes offered!

## 2022-08-21 NOTE — PHYSICAL EXAM
[No Acute Distress] : no acute distress [Normal Sclera/Conjunctiva] : normal sclera/conjunctiva [No JVD] : no jugular venous distention [No Respiratory Distress] : no respiratory distress  [Normal Rate] : normal rate  [No Edema] : there was no peripheral edema [No Focal Deficits] : no focal deficits [Speech Grossly Normal] : speech grossly normal [Alert and Oriented x3] : oriented to person, place, and time [de-identified] : calm and engaging

## 2022-08-21 NOTE — ADDENDUM
[FreeTextEntry1] : Medical record entries made by the scribe today, were at my direction and personally dictated to them by me, Dr. Sierra Dumas on 08/18/2022.  I have reviewed the chart and agree that the record accurately reflects my personal performance of the history, physical exam, assessment and plan.\par \par Fermin ALBARADO acting as scribe for Dr. Sierra Dumas MD on 08/18/2022 at 3:49 PM.\par

## 2022-10-06 LAB
ALBUMIN SERPL ELPH-MCNC: 3.7 G/DL
ALP BLD-CCNC: 77 U/L
ALT SERPL-CCNC: 29 U/L
ANION GAP SERPL CALC-SCNC: 12 MMOL/L
AST SERPL-CCNC: 29 U/L
BASOPHILS # BLD AUTO: 0.08 K/UL
BASOPHILS NFR BLD AUTO: 0.9 %
BILIRUB SERPL-MCNC: 0.2 MG/DL
BUN SERPL-MCNC: 11 MG/DL
CALCIUM SERPL-MCNC: 9.1 MG/DL
CHLORIDE SERPL-SCNC: 101 MMOL/L
CO2 SERPL-SCNC: 25 MMOL/L
CREAT SERPL-MCNC: 0.55 MG/DL
EGFR: 114 ML/MIN/1.73M2
EOSINOPHIL # BLD AUTO: 0.2 K/UL
EOSINOPHIL NFR BLD AUTO: 2.2 %
ESTIMATED AVERAGE GLUCOSE: 229 MG/DL
GLUCOSE SERPL-MCNC: 201 MG/DL
HBA1C MFR BLD HPLC: 9.6 %
HCT VFR BLD CALC: 40.7 %
HGB BLD-MCNC: 12.8 G/DL
IMM GRANULOCYTES NFR BLD AUTO: 0.2 %
LYMPHOCYTES # BLD AUTO: 3 K/UL
LYMPHOCYTES NFR BLD AUTO: 32.5 %
MAN DIFF?: NORMAL
MCHC RBC-ENTMCNC: 27.9 PG
MCHC RBC-ENTMCNC: 31.4 GM/DL
MCV RBC AUTO: 88.7 FL
MONOCYTES # BLD AUTO: 0.49 K/UL
MONOCYTES NFR BLD AUTO: 5.3 %
NEUTROPHILS # BLD AUTO: 5.45 K/UL
NEUTROPHILS NFR BLD AUTO: 58.9 %
PLATELET # BLD AUTO: 317 K/UL
POTASSIUM SERPL-SCNC: 4.4 MMOL/L
PROT SERPL-MCNC: 6.8 G/DL
RBC # BLD: 4.59 M/UL
RBC # FLD: 15.2 %
SODIUM SERPL-SCNC: 138 MMOL/L
WBC # FLD AUTO: 9.24 K/UL

## 2022-10-06 RX ORDER — SITAGLIPTIN 50 MG/1
50 TABLET, FILM COATED ORAL
Qty: 2 | Refills: 2 | Status: DISCONTINUED | COMMUNITY
Start: 2022-03-31 | End: 2022-10-06

## 2022-10-24 ENCOUNTER — APPOINTMENT (OUTPATIENT)
Dept: ENDOCRINOLOGY | Facility: CLINIC | Age: 47
End: 2022-10-24

## 2022-10-24 ENCOUNTER — NON-APPOINTMENT (OUTPATIENT)
Age: 47
End: 2022-10-24

## 2022-10-24 VITALS
HEIGHT: 60 IN | OXYGEN SATURATION: 97 % | SYSTOLIC BLOOD PRESSURE: 130 MMHG | HEART RATE: 102 BPM | WEIGHT: 226.31 LBS | DIASTOLIC BLOOD PRESSURE: 80 MMHG | RESPIRATION RATE: 18 BRPM | BODY MASS INDEX: 44.43 KG/M2

## 2022-10-24 PROCEDURE — 99205 OFFICE O/P NEW HI 60 MIN: CPT

## 2022-10-24 RX ORDER — SAXAGLIPTIN 5 MG/1
5 TABLET, FILM COATED ORAL
Qty: 90 | Refills: 0 | Status: DISCONTINUED | COMMUNITY
Start: 2022-10-06 | End: 2022-10-24

## 2022-10-24 NOTE — ASSESSMENT
[FreeTextEntry1] : DM2 in patient with morbid obesity,\par \par Obesity: \par discussed diet and exercise\par encouraged more exercise walking 30 min 3 x week\par Needs to try to have more protein for meals\par \par

## 2022-10-24 NOTE — REASON FOR VISIT
[Follow - Up] : a follow-up visit [Initial Evaluation] : an initial evaluation [DM Type 2] : DM Type 2

## 2022-10-31 ENCOUNTER — APPOINTMENT (OUTPATIENT)
Dept: FAMILY MEDICINE | Facility: CLINIC | Age: 47
End: 2022-10-31

## 2022-10-31 VITALS
DIASTOLIC BLOOD PRESSURE: 78 MMHG | WEIGHT: 226 LBS | OXYGEN SATURATION: 97 % | HEIGHT: 60 IN | BODY MASS INDEX: 44.37 KG/M2 | SYSTOLIC BLOOD PRESSURE: 126 MMHG | HEART RATE: 96 BPM

## 2022-10-31 DIAGNOSIS — H53.2 DIPLOPIA: ICD-10-CM

## 2022-10-31 PROCEDURE — G0008: CPT

## 2022-10-31 PROCEDURE — 99214 OFFICE O/P EST MOD 30 MIN: CPT | Mod: 25

## 2022-10-31 PROCEDURE — 90686 IIV4 VACC NO PRSV 0.5 ML IM: CPT

## 2022-11-01 PROBLEM — H53.2 DIPLOPIA: Status: ACTIVE | Noted: 2020-09-08

## 2022-11-01 NOTE — HISTORY OF PRESENT ILLNESS
[FreeTextEntry1] : Last seen in office August 2022 for same, encounter reviewed. \par \par Presents for FUV on chronic medical conditions of: DM\par \par Requesting medication refills of: metformin\par \par Recent consults reviewed and noted for:\par Endo (Type 2 diabetes mellitus without complication, without long-term current use of insulin, Obesity, Long term (current) use of oral hypoglycemic drugs, B12 deficiency,       DM2 in patient with morbid obesity. Poorly controlled.         October 2022) [de-identified] : \par In recent weeks KOFI endorses:\par \par A 1 C 9.6% August 2022, increased from 7.8% in March 2022.\par \par Pt states that she has begun Ozempic treatments, began treatments on Friday, states some soreness the following day otherwise normal.  No nausea reported, no asthma flares reported.  Established care with Marcia Stephens on 10/24/22 regarding DM management, next appt 12/12/22, labs anticipated then. \par \par Pt states that she has not scheduled an Opthalmology consult yet. \par \par Pt states that she walks the stairs at work, "15-20 flights a day."\par \par Pt opted for the influenza vaccine today.

## 2022-11-01 NOTE — ASSESSMENT
[FreeTextEntry1] : FUV for 47 year old WF with PMH as stated in HPI / active list. \par \par Management : \par \par See HPI and Plan.\par \par Pt opted for the influenza vaccine today. \par \par Encouraged to schedule Opthal appt.  \par \par Refill(s) provided for:\par Metformin \par \par Continue current medication regimens. \par \par Importance of daily 30 minute walk stressed for exercise. Importance of drinking 60-80 oz of FREE WATER daily stressed. \par \par Best wishes offered!

## 2022-11-01 NOTE — ADDENDUM
[FreeTextEntry1] : Medical record entries made by the scribe today, were at my direction and personally dictated to them by me, Dr. Sierra Dumas on 10/31/2022.  I have reviewed the chart and agree that the record accurately reflects my personal performance of the history, physical exam, assessment and plan.\par \par Fermin ALBARADO acting as scribe for Dr. Sierra Dumas MD on 10/31/2022 at 10:32 AM.\par

## 2022-11-22 ENCOUNTER — APPOINTMENT (OUTPATIENT)
Dept: ENDOCRINOLOGY | Facility: CLINIC | Age: 47
End: 2022-11-22

## 2022-11-22 PROCEDURE — G0108 DIAB MANAGE TRN  PER INDIV: CPT

## 2022-12-09 LAB
ALBUMIN SERPL ELPH-MCNC: 3.6 G/DL
ALP BLD-CCNC: 75 U/L
ALT SERPL-CCNC: 22 U/L
ANION GAP SERPL CALC-SCNC: 11 MMOL/L
AST SERPL-CCNC: 30 U/L
BASOPHILS # BLD AUTO: 0.06 K/UL
BASOPHILS NFR BLD AUTO: 0.5 %
BILIRUB SERPL-MCNC: 0.2 MG/DL
BUN SERPL-MCNC: 7 MG/DL
CALCIUM SERPL-MCNC: 9.3 MG/DL
CHLORIDE SERPL-SCNC: 102 MMOL/L
CHOLEST SERPL-MCNC: 137 MG/DL
CO2 SERPL-SCNC: 23 MMOL/L
CREAT SERPL-MCNC: 0.56 MG/DL
CREAT SPEC-SCNC: 144 MG/DL
EGFR: 113 ML/MIN/1.73M2
EOSINOPHIL # BLD AUTO: 0.12 K/UL
EOSINOPHIL NFR BLD AUTO: 1 %
ESTIMATED AVERAGE GLUCOSE: 203 MG/DL
FOLATE SERPL-MCNC: 16.1 NG/ML
GLUCOSE SERPL-MCNC: 238 MG/DL
HBA1C MFR BLD HPLC: 8.7 %
HCT VFR BLD CALC: 39.4 %
HDLC SERPL-MCNC: 38 MG/DL
HGB BLD-MCNC: 12.7 G/DL
IMM GRANULOCYTES NFR BLD AUTO: 0.3 %
LDLC SERPL CALC-MCNC: 72 MG/DL
LYMPHOCYTES # BLD AUTO: 2.88 K/UL
LYMPHOCYTES NFR BLD AUTO: 24.6 %
MAN DIFF?: NORMAL
MCHC RBC-ENTMCNC: 28.4 PG
MCHC RBC-ENTMCNC: 32.2 GM/DL
MCV RBC AUTO: 88.1 FL
MICROALBUMIN 24H UR DL<=1MG/L-MCNC: 1.4 MG/DL
MICROALBUMIN/CREAT 24H UR-RTO: 10 MG/G
MONOCYTES # BLD AUTO: 0.6 K/UL
MONOCYTES NFR BLD AUTO: 5.1 %
NEUTROPHILS # BLD AUTO: 8.04 K/UL
NEUTROPHILS NFR BLD AUTO: 68.5 %
NONHDLC SERPL-MCNC: 99 MG/DL
PLATELET # BLD AUTO: 317 K/UL
POTASSIUM SERPL-SCNC: 4.8 MMOL/L
PROT SERPL-MCNC: 6.7 G/DL
RBC # BLD: 4.47 M/UL
RBC # FLD: 15.1 %
SODIUM SERPL-SCNC: 137 MMOL/L
TRIGL SERPL-MCNC: 135 MG/DL
VIT B12 SERPL-MCNC: 575 PG/ML
WBC # FLD AUTO: 11.73 K/UL

## 2022-12-12 ENCOUNTER — APPOINTMENT (OUTPATIENT)
Dept: ENDOCRINOLOGY | Facility: CLINIC | Age: 47
End: 2022-12-12

## 2022-12-12 VITALS
HEART RATE: 96 BPM | WEIGHT: 212.5 LBS | TEMPERATURE: 97.8 F | RESPIRATION RATE: 18 BRPM | OXYGEN SATURATION: 96 % | HEIGHT: 60 IN | DIASTOLIC BLOOD PRESSURE: 70 MMHG | BODY MASS INDEX: 41.72 KG/M2 | SYSTOLIC BLOOD PRESSURE: 118 MMHG

## 2022-12-12 DIAGNOSIS — E66.9 OBESITY, UNSPECIFIED: ICD-10-CM

## 2022-12-12 DIAGNOSIS — Z79.84 LONG TERM (CURRENT) USE OF ORAL HYPOGLYCEMIC DRUGS: ICD-10-CM

## 2022-12-12 PROCEDURE — 99214 OFFICE O/P EST MOD 30 MIN: CPT

## 2022-12-12 NOTE — PHYSICAL EXAM
[Alert] : alert [Well Nourished] : well nourished [Obese] : obese [No Acute Distress] : no acute distress [Well Developed] : well developed [Normal Sclera/Conjunctiva] : normal sclera/conjunctiva [EOMI] : extra ocular movement intact [No Proptosis] : no proptosis [Normal Oropharynx] : the oropharynx was normal [Thyroid Not Enlarged] : the thyroid was not enlarged [No Thyroid Nodules] : no palpable thyroid nodules [No Respiratory Distress] : no respiratory distress [No Accessory Muscle Use] : no accessory muscle use [Clear to Auscultation] : lungs were clear to auscultation bilaterally [Normal S1, S2] : normal S1 and S2 [Normal Rate] : heart rate was normal [Regular Rhythm] : with a regular rhythm [No Edema] : no peripheral edema [Pedal Pulses Normal] : the pedal pulses are present [Normal Bowel Sounds] : normal bowel sounds [Not Tender] : non-tender [Not Distended] : not distended [Soft] : abdomen soft [Normal Anterior Cervical Nodes] : no anterior cervical lymphadenopathy [Normal Gait] : normal gait [No Rash] : no rash [No Tremors] : no tremors [Oriented x3] : oriented to person, place, and time [Acanthosis Nigricans] : no acanthosis nigricans

## 2022-12-12 NOTE — ASSESSMENT
[FreeTextEntry1] : DM2 in patient with morbid obesity . \par She works in a group home. \par \par DM2 : a1c 8.7 .  lost 9 lbs\par cont MF 1000 mg BID \par cont ozempic to 0.5 mg weekly.  \par discussed diet and exercise\par encouraged more exercise walking 30 min 3 x week\par eye exam referral \par microalb normal\par bgs 2x day . bgs 150-259's. \par b12 good. \par lipids: LDL excellent. will cont to monitor. \par \par Morbid obesity: loosing some weight lately\par discussed diet and exercise\par encouraged more exercise walking 30 min 3 x week\par \par \par \par \par

## 2023-01-04 ENCOUNTER — TRANSCRIPTION ENCOUNTER (OUTPATIENT)
Age: 48
End: 2023-01-04

## 2023-03-06 ENCOUNTER — RX RENEWAL (OUTPATIENT)
Age: 48
End: 2023-03-06

## 2023-03-14 ENCOUNTER — APPOINTMENT (OUTPATIENT)
Dept: ENDOCRINOLOGY | Facility: CLINIC | Age: 48
End: 2023-03-14

## 2023-04-02 PROBLEM — M54.50 LUMBAGO: Status: ACTIVE | Noted: 2023-04-02

## 2023-04-03 ENCOUNTER — APPOINTMENT (OUTPATIENT)
Dept: FAMILY MEDICINE | Facility: CLINIC | Age: 48
End: 2023-04-03
Payer: COMMERCIAL

## 2023-04-03 VITALS
HEART RATE: 97 BPM | DIASTOLIC BLOOD PRESSURE: 84 MMHG | OXYGEN SATURATION: 99 % | SYSTOLIC BLOOD PRESSURE: 128 MMHG | HEIGHT: 60 IN | BODY MASS INDEX: 41.82 KG/M2 | WEIGHT: 213 LBS

## 2023-04-03 PROCEDURE — 99214 OFFICE O/P EST MOD 30 MIN: CPT

## 2023-04-04 ENCOUNTER — APPOINTMENT (OUTPATIENT)
Dept: ORTHOPEDIC SURGERY | Facility: CLINIC | Age: 48
End: 2023-04-04
Payer: COMMERCIAL

## 2023-04-04 VITALS
HEART RATE: 89 BPM | BODY MASS INDEX: 41.82 KG/M2 | DIASTOLIC BLOOD PRESSURE: 85 MMHG | SYSTOLIC BLOOD PRESSURE: 131 MMHG | HEIGHT: 60 IN | WEIGHT: 213 LBS

## 2023-04-04 DIAGNOSIS — M54.50 LOW BACK PAIN, UNSPECIFIED: ICD-10-CM

## 2023-04-04 DIAGNOSIS — M54.12 RADICULOPATHY, CERVICAL REGION: ICD-10-CM

## 2023-04-04 PROCEDURE — 72040 X-RAY EXAM NECK SPINE 2-3 VW: CPT

## 2023-04-04 PROCEDURE — 99203 OFFICE O/P NEW LOW 30 MIN: CPT

## 2023-04-04 NOTE — ASSESSMENT
[FreeTextEntry1] : \par Acute encounter for 47 year old WF with PMH as stated in HPI / active list. \par \par Management : \par \par See HPI and Plan.\par \par Referral provided for Ortho appt scheduled for tomorrow, follow up as needed. \par \par Refill(s) provided for:\par Metformin   A 1 C Dec. 2022  8.7\par \par Make CPE appt!\par \par Best wishes offered!

## 2023-04-04 NOTE — ASSESSMENT
[FreeTextEntry1] : Patient presents with neck and lower back pain. Their symptoms are consistent with DDD and cervical radiculopathy. The nature of this condition was described at length with the patient and they verbalized understanding. \par \par Recommendations: \par -Physical Therapy\par -Medrol Dosepak\par -Methocarbamol\par -Followup with Dr. Foster in 4 weeks\par -Patient was given ample time to ask questions and all questions were answered to the patient's full satisfaction.\par \par The patient was in full agreement with this plan and appreciative of their care.\par

## 2023-04-04 NOTE — HISTORY OF PRESENT ILLNESS
[de-identified] : 04/04/2023: Patient is a 47 year-old, right-hand-dominant female who presents to the office today for initial evaluation of neck pain. Pain has been present for the past 3 days.  It is a constant pain that radiates to the posterior aspect of her left shoulder and down the posterior upper arm, ending at the elbow.  She has tried various over-the-counter anti-inflammatories with no improvement in her symptoms.  She denies any upper extremity weakness or paresthesias.  No other recent modalities were used to treat her symptoms.\par \par Seen by another physician many years ago. Dr. Mi (Neurology) stated she had 4 "bad discs" in her neck. \par Physical Therapy about 10 years ago which helped initially.\par \par No fever, chills, sweats, nausea or vomiting. No bowel or bladder dysfunction, no recent weight loss or gain, no night pain. This history is in addition to the intake form that I personally reviewed.\par \par

## 2023-04-04 NOTE — ADDENDUM
[FreeTextEntry1] : Medical record entries made by the scribe today, were at my direction and personally dictated to them by me, Dr. Sierra Dumas on 04/03/2023.  I have reviewed the chart and agree that the record accurately reflects my personal performance of the history, physical exam, assessment and plan.\par \par IFermin acting as scribe for Dr. Sierra Dumas MD on 04/03/2023 at 2:34 PM.\par \par \par

## 2023-04-04 NOTE — PHYSICAL EXAM
[UE/LE] : Sensory: Intact in bilateral upper & lower extremities [ALL] : dorsalis pedis, posterior tibial, femoral, popliteal, and radial 2+ and symmetric bilaterally [Normal Finger/nose] : finger to nose coordination [Normal] : Oriented to person, place, and time, insight and judgement were intact and the affect was normal [Poor Appearance] : well-appearing [Acute Distress] : not in acute distress [de-identified] : Gait Steady \par \par Spine \par No bony tenderness, no step-offs, \par \par Cervical  ROM \par Flexion 30 degrees\par Extension 40 Degrees \par Rotation 60 degrees\par Lateral bend 35 degrees\par \par Spurlings Test Negative\par \par Upper Extremities - Reflexes 2 +, \par Strength\par Shoulder Abduction - 5/5\par Elbow Flexion 5/5\par Elbow Extension 5/5\par Wrist Flexion 5/5\par Wrist Extension 5/5\par  strength 5/5\par Finger Abduction 5/5\par Murray's test negative \par Sensation intact and equal to light touch bilaterally\par Distal pulses intact \par \par Lumbar ROM\par Flexion 60 degrees\par Extension 25 degrees\par Lateral bend 25 degrees \par \par Lower Extremities Reflexes 2 +, \par Strength \par Hip flexion 5/5\par Knee Extension 5/5\par Dorsi/Plantar flexion 5/5\par EHL 5/5\par Clonus Negative \par Babinski Negative \par SLR - Negative \par Sensation intact and equal to light touch bilaterally\par Distal Pulses intact\par \par  [de-identified] : MARYLOUR [de-identified] : 2 xray views of the cervical spine and 2 xray views of the lumbar spine were obtained.\par \par -No fractures or dislocations\par -Dorsal curvature of the cervical spine between C2 and C7\par -Significant degenerative changes of multiple levels

## 2023-04-04 NOTE — PHYSICAL EXAM
[No Acute Distress] : no acute distress [Normal Sclera/Conjunctiva] : normal sclera/conjunctiva [No JVD] : no jugular venous distention [No Respiratory Distress] : no respiratory distress  [Normal Rate] : normal rate  [No Edema] : there was no peripheral edema [No Focal Deficits] : no focal deficits [Speech Grossly Normal] : speech grossly normal [Alert and Oriented x3] : oriented to person, place, and time [Normal Mood] : the mood was normal [de-identified] : calm and engaging  [de-identified] : pain upon turning head to the right, looking upwards.  UE strength intact  FROM with wincing noted \par pain upon turning head to the right, looking upwards.  \par pain upon turning head to the right, looking upwards.  \par pain upon turning head to the right, looking upwards.  \par pain upon turning head to the right, looking upwards.  \par pain upon turning head to the right, looking upwards.  \par pain upon turning head to the right, looking upwards.  \par pain in neck upon turning head to right and looking upwards

## 2023-04-04 NOTE — HISTORY OF PRESENT ILLNESS
[FreeTextEntry8] : \par KOFI REILLY is a 47 year old F who presents today for acute left sided neck and shoulder pain that radiates down LUE onset Thursday 3/30/2023.\delgado Denies any known HUMPHREY. \par \delgado Has Ortho appt scheduled for tomorrow 4/4/2023.  \par \delgado

## 2023-04-04 NOTE — REVIEW OF SYSTEMS
[Joint Pain] : joint pain [Negative] : Integumentary [Fever] : no fever [Chills] : no chills [Night Sweats] : no night sweats [FreeTextEntry9] : neck and shoulder pain, see HPI

## 2023-04-06 ENCOUNTER — TRANSCRIPTION ENCOUNTER (OUTPATIENT)
Age: 48
End: 2023-04-06

## 2023-04-10 PROBLEM — M54.12 CERVICAL RADICULOPATHY, ACUTE: Status: ACTIVE | Noted: 2023-04-04

## 2023-05-09 ENCOUNTER — APPOINTMENT (OUTPATIENT)
Dept: ORTHOPEDIC SURGERY | Facility: CLINIC | Age: 48
End: 2023-05-09
Payer: COMMERCIAL

## 2023-05-09 VITALS
DIASTOLIC BLOOD PRESSURE: 90 MMHG | HEIGHT: 60 IN | WEIGHT: 213 LBS | BODY MASS INDEX: 41.82 KG/M2 | HEART RATE: 98 BPM | SYSTOLIC BLOOD PRESSURE: 163 MMHG

## 2023-05-09 DIAGNOSIS — M54.2 CERVICALGIA: ICD-10-CM

## 2023-05-09 DIAGNOSIS — M50.30 OTHER CERVICAL DISC DEGENERATION, UNSPECIFIED CERVICAL REGION: ICD-10-CM

## 2023-05-09 PROCEDURE — 99213 OFFICE O/P EST LOW 20 MIN: CPT

## 2023-05-09 NOTE — DISCUSSION/SUMMARY
[de-identified] : Aced upon a negative RICHARD questionnaire nonfocal cervical radiculopathy posterior cervical myositis I recommend a trial of physical therapy continuation of anti-inflammatories also a short course of steroids may also be beneficial if this is still persistent in approximately 4 to 6 weeks patient will be followed up at that point in time and an MRI can be ordered if clinically no significant improvement or deterioration is present

## 2023-05-09 NOTE — PHYSICAL EXAM
[de-identified] : CONSTITUTIONAL:  Patient is a very pleasant individual who is well-nourished and appears stated age. \par PSYCHIATRIC:  Alert and oriented times three and in no apparent distress, and participates with orthopedic evaluation well.\par HEAD:  Atraumatic and  nonsyndromic in appearance.\par EENT: No thyromegaly, EOMI.\par RESPIRATORY:  Respiratory rate is regular, not dyspneic on examination.\par LYMPHATICS:  There is no cervical or axillary lymphadenopathy.\par INTEGUMENTARY:  Skin is clean, dry, and intact about the bilateral upper extremities and cervical spine. \par VASCULAR:   There is brisk capillary refill about the bilateral upper extremities and radial pulses are 2/4. \par NEUROLOGIC:  Negative L'hirmitte, negative Spurling's sign. There are no pathologic reflexes. There is no decrease in sensation of the bilateral upper extremities on Wartenberg pinwheel examination.  Deep tendon reflexes are well-maintained at +2/4 of the bilateral upper extremities and are symmetric.\par MUSCULOSKELETAL:  There is no visible muscular atrophy.  Manual motor strength is well maintained in the bilateral upper extremities.  Cervical range of motion is well maintained.  The patient ambulates in a non-myelopathic manner. Normal secondary orthopaedic exam of bilateral shoulders, elbows and hands.  Elbow flexion and extension, wrist extension, finger flexion and abduction are well maintained.\par \par Posterior cervical myositis. [de-identified] : Previous cervical x-rays have been reviewed that shows focal cervical kyphosis at C3-C4.  No acute osseous abnormalities

## 2023-05-09 NOTE — HISTORY OF PRESENT ILLNESS
[de-identified] : Patient was seen by her urgency our staff diagnosed with cervical spondylosis referred for physical therapy anti-inflammatories she is not enrolled in physical therapy she is taking intermittent anti-inflammatories RICHARD questionnaire is negative.  Complaints of intermittent paresthesias in the left upper extremity.  Patient states she is improving [Improving] : improving [10] : a maximum pain level of 10/10 [Intermit.] : ~He/She~ states the symptoms seem to be intermittent [Bending] : worsened by bending [Lifting] : worsened by lifting [NSAIDs] : relieved by nonsteroidal anti-inflammatory drugs [Rest] : relieved by rest [Ataxia] : no ataxia [Incontinence] : no incontinence [Loss of Dexterity] : good dexterity [Urinary Ret.] : no urinary retention

## 2023-06-23 NOTE — ED ADULT NURSE NOTE - NSIMPLEMENTINTERV_GEN_ALL_ED
Addended by: YAZMIN CHOUDHURY on: 6/23/2023 11:57 AM     Modules accepted: Orders     Implemented All Universal Safety Interventions:  [place] to call system. Call bell, personal items and telephone within reach. Instruct patient to call for assistance. Room bathroom lighting operational. Non-slip footwear when patient is off stretcher. Physically safe environment: no spills, clutter or unnecessary equipment. Stretcher in lowest position, wheels locked, appropriate side rails in place.

## 2023-07-26 ENCOUNTER — TRANSCRIPTION ENCOUNTER (OUTPATIENT)
Age: 48
End: 2023-07-26

## 2023-08-17 ENCOUNTER — APPOINTMENT (OUTPATIENT)
Dept: FAMILY MEDICINE | Facility: CLINIC | Age: 48
End: 2023-08-17
Payer: COMMERCIAL

## 2023-08-17 VITALS
SYSTOLIC BLOOD PRESSURE: 148 MMHG | OXYGEN SATURATION: 98 % | BODY MASS INDEX: 42.01 KG/M2 | DIASTOLIC BLOOD PRESSURE: 98 MMHG | WEIGHT: 214 LBS | HEIGHT: 60 IN | HEART RATE: 88 BPM

## 2023-08-17 DIAGNOSIS — E53.8 DEFICIENCY OF OTHER SPECIFIED B GROUP VITAMINS: ICD-10-CM

## 2023-08-17 DIAGNOSIS — Z00.00 ENCOUNTER FOR GENERAL ADULT MEDICAL EXAMINATION W/OUT ABNORMAL FINDINGS: ICD-10-CM

## 2023-08-17 PROCEDURE — 36415 COLL VENOUS BLD VENIPUNCTURE: CPT

## 2023-08-17 PROCEDURE — 99396 PREV VISIT EST AGE 40-64: CPT | Mod: 25

## 2023-08-17 PROCEDURE — 81003 URINALYSIS AUTO W/O SCOPE: CPT | Mod: QW

## 2023-08-17 PROCEDURE — 99213 OFFICE O/P EST LOW 20 MIN: CPT | Mod: 25

## 2023-08-21 ENCOUNTER — TRANSCRIPTION ENCOUNTER (OUTPATIENT)
Age: 48
End: 2023-08-21

## 2023-08-23 NOTE — ED STATDOCS - CHPI ED SYMPTOM NEG
Per Nitida, ok for 9/7/2023 at 1pm.  Spoke with patient and confirmed date/time.  Reviewed pre procedural instructions.      Prep for case initiated and sent to Alexander, Calendar updated.    Please check for insurance authorization, thanks!!     no chills

## 2023-08-24 PROBLEM — Z00.00 ENCOUNTER FOR PREVENTIVE HEALTH EXAMINATION: Status: ACTIVE | Noted: 2020-03-24

## 2023-08-24 NOTE — ADDENDUM
[FreeTextEntry1] : Medical record entries made by the scribe today, were at my direction and personally dictated to them by me, Dr. Sierra Dumas on 08/17/2023.  I have reviewed the chart and agree that the record accurately reflects my personal performance of the history, physical exam, assessment and plan.  I, Xochitl Hurd  acting as scribe for Dr. Sierra Dumas MD on 08/17/2023 at 10:42 AM.  PAST MEDICAL HISTORY:  Gout     HIV disease

## 2023-08-24 NOTE — HEALTH RISK ASSESSMENT
[No] : In the past 12 months have you used drugs other than those required for medical reasons? No [0] : 2) Feeling down, depressed, or hopeless: Not at all (0) [PHQ-2 Negative - No further assessment needed] : PHQ-2 Negative - No further assessment needed [Audit-CScore] : 0 [[id number]] : 0 [MammogramDate] : NEVER [Never] : Never

## 2023-08-24 NOTE — PHYSICAL EXAM
[Well-Appearing] : well-appearing [Normal Sclera/Conjunctiva] : normal sclera/conjunctiva [PERRL] : pupils equal round and reactive to light [No Lymphadenopathy] : no lymphadenopathy [Thyroid Normal, No Nodules] : the thyroid was normal and there were no nodules present [No Respiratory Distress] : no respiratory distress  [No Accessory Muscle Use] : no accessory muscle use [Clear to Auscultation] : lungs were clear to auscultation bilaterally [Normal Rate] : normal rate  [Regular Rhythm] : with a regular rhythm [No Murmur] : no murmur heard [Soft] : abdomen soft [Non Tender] : non-tender [No Spinal Tenderness] : no spinal tenderness [No Focal Deficits] : no focal deficits [Normal Gait] : normal gait [Speech Grossly Normal] : speech grossly normal [Alert and Oriented x3] : oriented to person, place, and time [No Edema] : there was no peripheral edema [Deep Tendon Reflexes (DTR)] : deep tendon reflexes were 2+ and symmetric [No Masses] : no palpable masses [Kyphosis] : no kyphosis [de-identified] : engaging    [de-identified] : OMM  mild facial asymmetry   POOR dentiton  [de-identified] : no discreet mass or legions  [de-identified] : obese [de-identified] : no temors no pronator drift

## 2023-08-24 NOTE — HISTORY OF PRESENT ILLNESS
[FreeTextEntry1] : CPE Last seen in April 2023 for acute visit, last CPE was in March 2022, encounters reviewed.   Recent consults reviewed and noted for: Ortho(initial visit for neck pain,     May 2023)  KOFI REILLY is a 48 year old F who presents today for CPE. Pt has no complaints, has been well, and denies any recent ER visits/hospitalizations/MVA's or MSK injuries.  Regarding DM, pt is taking Metformin/Ozempic. Within the last year pt has lost 12 lbs.    Has not followed with GYN for the past 5 years due to work. Reports regular menses.  Has never had a mammogram.   Regarding cervical strain, pt reports the pain has lessened since visit with Ortho.  Pt states she works 7 days a week. States her workload is heavy and she does the job of two people. States difficulty following with necessary specialist because of work.  Poor dentition! Has not followed with a dentist.   Repeated BP in office - 126/78  Reports Phx of COVID - mild case.  [de-identified] : In review March 2022:  NPA CPE NP to establish care Serene presents today to establish care being referred to me by her sister ELSIE David, RN at [facility]   She  is an affable 46 year old  with PMH significant for Asthma since age 12 ; recalls admission to hospital in 199 ears ago) 2; ; [place] palsy x 2 endorses Left side of face never fully resolved; some loss of felling and facial droop ( MILD) ; Obesity and  DM  ( 5 years ago) .  PSH noted for  NONE  Denies any recent ER/UC visits; no hospitalizations /MVA's or NEW MSK injuries.   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

## 2023-08-24 NOTE — REVIEW OF SYSTEMS
[Negative] : Integumentary [Fever] : no fever [Fatigue] : no fatigue [Redness] : no redness [Itching] : no itching [Chest Pain] : no chest pain [Palpitations] : no palpitations [Wheezing] : no wheezing [Cough] : no cough [Dysuria] : no dysuria [Hematuria] : no hematuria [Headache] : no headache [Dizziness] : no dizziness [Anxiety] : no anxiety [Depression] : no depression [FreeTextEntry3] : eyeglasses  [FreeTextEntry9] : cervical strain, see HPI

## 2023-08-24 NOTE — ASSESSMENT
[FreeTextEntry1] : Annual exam for 48 year year old WF with PMH as stated in HPI / active list.   Management :   Breast examination in office today with chaperone present - no discreet mass or legions   Mammogram script provided.  Refill(s) provided for: Metformin HCL 1000 MG Ozempic 0.5 MG  See HPI and Plan  Labs in office today.   Will advise.   Medications reconciled.

## 2023-08-27 LAB
ALBUMIN SERPL ELPH-MCNC: 3.9 G/DL
ALP BLD-CCNC: 78 U/L
ALT SERPL-CCNC: 20 U/L
ANION GAP SERPL CALC-SCNC: 17 MMOL/L
AST SERPL-CCNC: 21 U/L
BILIRUB SERPL-MCNC: 0.3 MG/DL
BUN SERPL-MCNC: 9 MG/DL
CALCIUM SERPL-MCNC: 9.1 MG/DL
CHLORIDE SERPL-SCNC: 103 MMOL/L
CHOLEST SERPL-MCNC: 156 MG/DL
CO2 SERPL-SCNC: 16 MMOL/L
CREAT SERPL-MCNC: 0.53 MG/DL
EGFR: 114 ML/MIN/1.73M2
ESTIMATED AVERAGE GLUCOSE: 183 MG/DL
GLUCOSE SERPL-MCNC: 206 MG/DL
HBA1C MFR BLD HPLC: 8 %
HDLC SERPL-MCNC: 39 MG/DL
LDLC SERPL CALC-MCNC: 97 MG/DL
NONHDLC SERPL-MCNC: 117 MG/DL
POTASSIUM SERPL-SCNC: 4.5 MMOL/L
PROT SERPL-MCNC: 6.9 G/DL
SODIUM SERPL-SCNC: 136 MMOL/L
TRIGL SERPL-MCNC: 109 MG/DL
TSH SERPL-ACNC: 0.71 UIU/ML

## 2023-10-23 ENCOUNTER — RX RENEWAL (OUTPATIENT)
Age: 48
End: 2023-10-23

## 2024-01-30 ENCOUNTER — TRANSCRIPTION ENCOUNTER (OUTPATIENT)
Age: 49
End: 2024-01-30

## 2024-04-02 ENCOUNTER — RX RENEWAL (OUTPATIENT)
Age: 49
End: 2024-04-02

## 2024-05-23 ENCOUNTER — TRANSCRIPTION ENCOUNTER (OUTPATIENT)
Age: 49
End: 2024-05-23

## 2024-06-03 ENCOUNTER — APPOINTMENT (OUTPATIENT)
Dept: INTERNAL MEDICINE | Facility: CLINIC | Age: 49
End: 2024-06-03
Payer: COMMERCIAL

## 2024-06-03 VITALS
DIASTOLIC BLOOD PRESSURE: 80 MMHG | OXYGEN SATURATION: 96 % | SYSTOLIC BLOOD PRESSURE: 140 MMHG | HEIGHT: 60 IN | HEART RATE: 88 BPM | WEIGHT: 213 LBS | BODY MASS INDEX: 41.82 KG/M2

## 2024-06-03 DIAGNOSIS — J45.909 UNSPECIFIED ASTHMA, UNCOMPLICATED: ICD-10-CM

## 2024-06-03 DIAGNOSIS — E66.01 MORBID (SEVERE) OBESITY DUE TO EXCESS CALORIES: ICD-10-CM

## 2024-06-03 DIAGNOSIS — E11.9 TYPE 2 DIABETES MELLITUS W/OUT COMPLICATIONS: ICD-10-CM

## 2024-06-03 PROCEDURE — 99214 OFFICE O/P EST MOD 30 MIN: CPT

## 2024-06-03 PROCEDURE — G2211 COMPLEX E/M VISIT ADD ON: CPT | Mod: NC

## 2024-06-03 RX ORDER — METFORMIN HYDROCHLORIDE 1000 MG/1
1000 TABLET, COATED ORAL
Qty: 90 | Refills: 0 | Status: ACTIVE | COMMUNITY
Start: 2020-08-26 | End: 1900-01-01

## 2024-06-03 RX ORDER — ALBUTEROL SULFATE 90 UG/1
108 (90 BASE) INHALANT RESPIRATORY (INHALATION)
Qty: 1 | Refills: 2 | Status: ACTIVE | COMMUNITY
Start: 2020-08-26 | End: 1900-01-01

## 2024-06-03 RX ORDER — SEMAGLUTIDE 2.68 MG/ML
8 INJECTION, SOLUTION SUBCUTANEOUS
Qty: 4 | Refills: 0 | Status: ACTIVE | COMMUNITY
Start: 2022-10-24 | End: 1900-01-01

## 2024-06-03 RX ORDER — METHYLPREDNISOLONE 4 MG/1
4 TABLET ORAL
Qty: 1 | Refills: 1 | Status: DISCONTINUED | COMMUNITY
Start: 2023-04-04 | End: 2024-06-03

## 2024-06-03 RX ORDER — FLUTICASONE PROPIONATE AND SALMETEROL 50; 250 UG/1; UG/1
250-50 POWDER RESPIRATORY (INHALATION)
Qty: 1 | Refills: 0 | Status: ACTIVE | COMMUNITY
Start: 2020-08-26 | End: 1900-01-01

## 2024-06-03 RX ORDER — METHOCARBAMOL 500 MG/1
500 TABLET, FILM COATED ORAL
Qty: 10 | Refills: 0 | Status: DISCONTINUED | COMMUNITY
Start: 2023-04-04 | End: 2024-06-03

## 2024-06-03 NOTE — REVIEW OF SYSTEMS
[Shortness Of Breath] : no shortness of breath [Wheezing] : wheezing [Cough] : no cough [Dyspnea on Exertion] : no dyspnea on exertion [Negative] : Heme/Lymph

## 2024-06-03 NOTE — PHYSICAL EXAM
[No Acute Distress] : no acute distress [Well Nourished] : well nourished [Well Developed] : well developed [Well-Appearing] : well-appearing [Normal Sclera/Conjunctiva] : normal sclera/conjunctiva [PERRL] : pupils equal round and reactive to light [EOMI] : extraocular movements intact [Normal Outer Ear/Nose] : the outer ears and nose were normal in appearance [Normal Oropharynx] : the oropharynx was normal [No JVD] : no jugular venous distention [No Lymphadenopathy] : no lymphadenopathy [Supple] : supple [Thyroid Normal, No Nodules] : the thyroid was normal and there were no nodules present [No Respiratory Distress] : no respiratory distress  [No Accessory Muscle Use] : no accessory muscle use [Clear to Auscultation] : lungs were clear to auscultation bilaterally [Normal Rate] : normal rate  [Regular Rhythm] : with a regular rhythm [Normal S1, S2] : normal S1 and S2 [No Murmur] : no murmur heard [No Carotid Bruits] : no carotid bruits [No Abdominal Bruit] : a ~M bruit was not heard ~T in the abdomen [No Varicosities] : no varicosities [Pedal Pulses Present] : the pedal pulses are present [No Edema] : there was no peripheral edema [No Palpable Aorta] : no palpable aorta [No Extremity Clubbing/Cyanosis] : no extremity clubbing/cyanosis [Soft] : abdomen soft [Non Tender] : non-tender [Non-distended] : non-distended [No Masses] : no abdominal mass palpated [No HSM] : no HSM [Normal Bowel Sounds] : normal bowel sounds [Normal Posterior Cervical Nodes] : no posterior cervical lymphadenopathy [Normal Anterior Cervical Nodes] : no anterior cervical lymphadenopathy [No CVA Tenderness] : no CVA  tenderness [No Spinal Tenderness] : no spinal tenderness [No Joint Swelling] : no joint swelling [Grossly Normal Strength/Tone] : grossly normal strength/tone [No Rash] : no rash [Coordination Grossly Intact] : coordination grossly intact [No Focal Deficits] : no focal deficits [Normal Gait] : normal gait [Deep Tendon Reflexes (DTR)] : deep tendon reflexes were 2+ and symmetric [Normal Affect] : the affect was normal [Normal Insight/Judgement] : insight and judgment were intact [Comprehensive Foot Exam Normal] : Right and left foot were examined and both feet are normal. No ulcers in either foot. Toes are normal and with full ROM.  Normal tactile sensation with monofilament testing throughout both feet [de-identified] : poor dentition noted

## 2024-06-03 NOTE — HISTORY OF PRESENT ILLNESS
[FreeTextEntry1] : f/u medication renewal  [de-identified] : Patient is a 48-year-old female presenting for a follow-up visit for medication renewal. Patient reports her asthma is worse in the fall and spring, uses Advair Diskus BID in spring and fall, but does not use it in summer or winter. Uses albuterol 1-2x/week as she gets chest tightness/wheezing when cleaning products are used at work. She has not followed-up with pulmonology in many years. Regarding her diabetes, she is tolerating ozempic and Metformin well. Has not used ozempic in 2 months due to issues with obtaining it. No N/V/D, constipation, or abdominal pain. She checks blood glucose 1x/day, varying between morning and night. Usually run between 100-200. Usually it's in the 100s when she wakes up. Reports A1C was as high as > 13 4 years ago. She reports seeing a nutritionist once in the past. Patient endorses increased stress at work due to recent pay cut and unpaid overtime. Patient was previously recommended to see a dentist in the past for poor dentition, but she has not seen one.

## 2024-06-03 NOTE — PLAN
[FreeTextEntry1] : Chronic asthma: - Recommend re-establishment of care with pulmonology for PFTs. - Recommend utilizing Advair diskus daily year-round (rather than just daily in spring and fall), as she is still experiencing exacerbations due to cleaning supplies used daily at work. - Discussed updated CRYS guidelines using ICS-LABA as first line treatment for asthma - Albuterol sulfate  mcg inhaler and Advair Diskus 250-50 mcg renewed at today's visit  Chronic diabetes: - Metformin 1000 mg TID and Ozempic 2 mg/mL renewed at today's visit - Patient may need adjustment in medication as her last A1C was still above target. Script for labs to check A1C provided to patient. - [facility] nutrition referral placed. Recommend regular meetings with a nutritionist to optimize weight and DM2 treatment. Patient agreeable to trialing nutrition therapy. - Bilateral foot exam normal with no signs of breaks in skin or infection. No neuropathy noted. Recommend always wearing hard-toed shoes around the house and out of the house to prevent infection. No pedicures. - Patient did not fast today. Script provided to patient to have fasting blood work done at outside lab.   Poor dentition: - Discussed the risks of infection associated with poor dentition, especially in patients with diabetes - Recommend establishing care with a dentist to improve dentition and check for dental infections  f/u 3 months

## 2024-07-08 ENCOUNTER — RX RENEWAL (OUTPATIENT)
Age: 49
End: 2024-07-08

## 2024-07-08 RX ORDER — FLUTICASONE PROPIONATE AND SALMETEROL 250; 50 UG/1; UG/1
250-50 POWDER RESPIRATORY (INHALATION)
Qty: 60 | Refills: 0 | Status: ACTIVE | COMMUNITY
Start: 2024-07-08 | End: 1900-01-01

## 2024-07-29 PROBLEM — Z86.69 HISTORY OF BELL'S PALSY: Status: RESOLVED | Noted: 2020-08-26 | Resolved: 2024-07-29

## 2024-08-05 ENCOUNTER — APPOINTMENT (OUTPATIENT)
Dept: INTERNAL MEDICINE | Facility: CLINIC | Age: 49
End: 2024-08-05

## 2024-08-08 ENCOUNTER — RX RENEWAL (OUTPATIENT)
Age: 49
End: 2024-08-08

## 2024-08-21 ENCOUNTER — APPOINTMENT (OUTPATIENT)
Dept: PULMONOLOGY | Facility: CLINIC | Age: 49
End: 2024-08-21
Payer: COMMERCIAL

## 2024-08-21 VITALS
OXYGEN SATURATION: 97 % | DIASTOLIC BLOOD PRESSURE: 80 MMHG | SYSTOLIC BLOOD PRESSURE: 124 MMHG | RESPIRATION RATE: 16 BRPM | HEART RATE: 102 BPM

## 2024-08-21 VITALS — BODY MASS INDEX: 40.84 KG/M2 | WEIGHT: 208 LBS | HEIGHT: 60 IN

## 2024-08-21 DIAGNOSIS — J45.909 UNSPECIFIED ASTHMA, UNCOMPLICATED: ICD-10-CM

## 2024-08-21 PROCEDURE — 99204 OFFICE O/P NEW MOD 45 MIN: CPT

## 2024-08-21 PROCEDURE — G2211 COMPLEX E/M VISIT ADD ON: CPT | Mod: NC

## 2024-08-21 RX ORDER — MONTELUKAST 10 MG/1
10 TABLET, FILM COATED ORAL DAILY
Qty: 30 | Refills: 5 | Status: ACTIVE | COMMUNITY
Start: 2024-08-21 | End: 1900-01-01

## 2024-08-21 NOTE — HISTORY OF PRESENT ILLNESS
[Never] : never [TextBox_4] : 49F PMH never a smoker, asthma, DM on Ozempic who presents for initial pulmonary evaluation of asthma. Uses Advair 250-50 and albuterol 1-3x per week. 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 Symptoms worsened with seasonal changes, hot to cold. She feels her asthma is 'mostly' controlled. It is worse when her group home is being cleaned with bleach products.

## 2024-08-21 NOTE — ASSESSMENT
[FreeTextEntry1] : 49F PMH never a smoker, asthma, DM on Ozempic who presents for initial pulmonary evaluation of asthma.   - Uses Advair 250-50 and albuterol 1-3x per week.  - Will increase Advair to 500-50 - Pt has been on Montelukast in the past but stopped due to insurance coverage - She denies history of anxiety or depression - Will restart Montelukast - F/u first available appt for full PFT - Avoid respiratory irritants such as bleach cleaning products  The patient expressed understanding and agreement with the plan as outlined above and accepts responsibility to be compliant with any recommended testing, treatment, and follow-up visits.  All relevant questions and concerns were addressed.  45 minutes of time were spent on the encounter. Medical records were reviewed, including but not limited to hospital records, outpatient records, laboratory data, and diagnostic imaging studies. Greater than 50% of the face-to-face encounter time was spent on counseling and/or coordination of care.  Gilbert Burns MD, [facility] [place] [address] [place], [place] [address] T: [phone] F: [phone]

## 2024-09-06 ENCOUNTER — RX RENEWAL (OUTPATIENT)
Age: 49
End: 2024-09-06

## 2024-09-10 ENCOUNTER — LABORATORY RESULT (OUTPATIENT)
Age: 49
End: 2024-09-10

## 2024-09-10 ENCOUNTER — APPOINTMENT (OUTPATIENT)
Dept: INTERNAL MEDICINE | Facility: CLINIC | Age: 49
End: 2024-09-10
Payer: COMMERCIAL

## 2024-09-10 VITALS
WEIGHT: 210 LBS | SYSTOLIC BLOOD PRESSURE: 138 MMHG | HEART RATE: 91 BPM | BODY MASS INDEX: 41.23 KG/M2 | DIASTOLIC BLOOD PRESSURE: 80 MMHG | HEIGHT: 60 IN | OXYGEN SATURATION: 97 %

## 2024-09-10 DIAGNOSIS — L60.3 NAIL DYSTROPHY: ICD-10-CM

## 2024-09-10 DIAGNOSIS — J45.909 UNSPECIFIED ASTHMA, UNCOMPLICATED: ICD-10-CM

## 2024-09-10 DIAGNOSIS — K08.9 DISORDER OF TEETH AND SUPPORTING STRUCTURES, UNSPECIFIED: ICD-10-CM

## 2024-09-10 DIAGNOSIS — E11.9 TYPE 2 DIABETES MELLITUS W/OUT COMPLICATIONS: ICD-10-CM

## 2024-09-10 DIAGNOSIS — E66.01 MORBID (SEVERE) OBESITY DUE TO EXCESS CALORIES: ICD-10-CM

## 2024-09-10 PROCEDURE — 99214 OFFICE O/P EST MOD 30 MIN: CPT

## 2024-09-10 PROCEDURE — 36415 COLL VENOUS BLD VENIPUNCTURE: CPT

## 2024-09-10 PROCEDURE — 81003 URINALYSIS AUTO W/O SCOPE: CPT | Mod: QW

## 2024-09-10 PROCEDURE — G2211 COMPLEX E/M VISIT ADD ON: CPT | Mod: NC

## 2024-09-10 NOTE — PLAN
[FreeTextEntry1] : Diabetes: - Chronic, stable - Patient is currently managed with Metformin and Ozempic. Tolerating well with no side effects. - She reported checking her blood sugar levels only once or twice a month. Recommend considering the use of a Dexcom device. Patient not interested at this time - Recommended annual podiatry and ophthalmology visits. Podiatry referral placed. Patient states she goes to [facility] for [facility] exams. Recommend patient make appt for repeat eye exam - Discussed risk of diabetic retinopathy and the importance of annual eye exams. Discussed importance of following with podiatry for foot/nail care due to diabetes and risk of infections. - Continue Metformin and Ozempic as tolerated. - Labs/urine collected and sent today  Asthma: - Chronic, stable - Patient is currently managed with updated medications from the pulmonologist and pulmonary function tests are planned. - Continue medications as prescribed by the pulmonologist.  Poor dentition: - Patient with noted poor dentition on exam. Patient was recommended to see dentist by previous PCP, but patient has not scheduled appt - Offered to recommend dentists to patient, patient declined as she said she knows of one in [place]. Strongly recommend patient make appt for evaluation. - Dental referral placed today  f/u 3 months

## 2024-09-10 NOTE — PHYSICAL EXAM
[No Acute Distress] : no acute distress [Well Nourished] : well nourished [Well Developed] : well developed [Well-Appearing] : well-appearing [Normal Sclera/Conjunctiva] : normal sclera/conjunctiva [PERRL] : pupils equal round and reactive to light [EOMI] : extraocular movements intact [Normal Outer Ear/Nose] : the outer ears and nose were normal in appearance [Normal Oropharynx] : the oropharynx was normal [No JVD] : no jugular venous distention [No Lymphadenopathy] : no lymphadenopathy [Supple] : supple [Thyroid Normal, No Nodules] : the thyroid was normal and there were no nodules present [No Respiratory Distress] : no respiratory distress  [No Accessory Muscle Use] : no accessory muscle use [Clear to Auscultation] : lungs were clear to auscultation bilaterally [Normal Rate] : normal rate  [Regular Rhythm] : with a regular rhythm [Normal S1, S2] : normal S1 and S2 [No Murmur] : no murmur heard [No Carotid Bruits] : no carotid bruits [No Abdominal Bruit] : a ~M bruit was not heard ~T in the abdomen [No Varicosities] : no varicosities [Pedal Pulses Present] : the pedal pulses are present [No Edema] : there was no peripheral edema [No Palpable Aorta] : no palpable aorta [No Extremity Clubbing/Cyanosis] : no extremity clubbing/cyanosis [Soft] : abdomen soft [Non Tender] : non-tender [Non-distended] : non-distended [No Masses] : no abdominal mass palpated [No HSM] : no HSM [Normal Bowel Sounds] : normal bowel sounds [Normal Posterior Cervical Nodes] : no posterior cervical lymphadenopathy [Normal Anterior Cervical Nodes] : no anterior cervical lymphadenopathy [No CVA Tenderness] : no CVA  tenderness [No Spinal Tenderness] : no spinal tenderness [No Joint Swelling] : no joint swelling [Grossly Normal Strength/Tone] : grossly normal strength/tone [No Rash] : no rash [Coordination Grossly Intact] : coordination grossly intact [No Focal Deficits] : no focal deficits [Normal Gait] : normal gait [Deep Tendon Reflexes (DTR)] : deep tendon reflexes were 2+ and symmetric [Normal Affect] : the affect was normal [Normal Insight/Judgement] : insight and judgment were intact [de-identified] : poor dentition [de-identified] : dryness of bilateral feet with dystrophic toenails bilaterally

## 2024-09-10 NOTE — HISTORY OF PRESENT ILLNESS
[FreeTextEntry1] : f/u for DM2 [de-identified] : Patient is a 49-year-old female with PMH DM2 presenting for a follow-up visit. Patient reports she has not been checking her BG at home. Only checks it once or twice a month. She reports she has been taking Ozempic and metformin with no side effects including nausea, vomiting, diarrhea, or abdominal pain. She recently saw pul for asthma and her Advair dosage was changed. She is following up with pul at end of September for PFTs. She reports her last eye exam was 2 years ago. She does not see podiatry. She has not made an appt with a dentist.

## 2024-09-16 LAB
25(OH)D3 SERPL-MCNC: 43.8 NG/ML
ALBUMIN SERPL ELPH-MCNC: 3.9 G/DL
ALP BLD-CCNC: 75 U/L
ALT SERPL-CCNC: 34 U/L
ANION GAP SERPL CALC-SCNC: 11 MMOL/L
APPEARANCE: CLEAR
AST SERPL-CCNC: 27 U/L
BACTERIA: NEGATIVE /HPF
BASOPHILS # BLD AUTO: 0.08 K/UL
BASOPHILS NFR BLD AUTO: 0.9 %
BILIRUB SERPL-MCNC: 0.2 MG/DL
BILIRUBIN URINE: NEGATIVE
BLOOD URINE: ABNORMAL
BUN SERPL-MCNC: 9 MG/DL
CALCIUM SERPL-MCNC: 9.2 MG/DL
CAST: 0 /LPF
CHLORIDE SERPL-SCNC: 101 MMOL/L
CHOLEST SERPL-MCNC: 183 MG/DL
CO2 SERPL-SCNC: 25 MMOL/L
COLOR: YELLOW
CREAT SERPL-MCNC: 0.59 MG/DL
EGFR: 110 ML/MIN/1.73M2
EOSINOPHIL # BLD AUTO: 0.16 K/UL
EOSINOPHIL NFR BLD AUTO: 1.9 %
EPITHELIAL CELLS: 4 /HPF
ESTIMATED AVERAGE GLUCOSE: 266 MG/DL
FOLATE SERPL-MCNC: 12.3 NG/ML
GLUCOSE QUALITATIVE U: >=1000 MG/DL
GLUCOSE SERPL-MCNC: 284 MG/DL
HBA1C MFR BLD HPLC: 10.9 %
HCT VFR BLD CALC: 38.5 %
HDLC SERPL-MCNC: 51 MG/DL
HGB BLD-MCNC: 12 G/DL
IMM GRANULOCYTES NFR BLD AUTO: 0.1 %
KETONES URINE: NEGATIVE MG/DL
LDLC SERPL CALC-MCNC: 111 MG/DL
LEUKOCYTE ESTERASE URINE: NEGATIVE
LYMPHOCYTES # BLD AUTO: 3.29 K/UL
LYMPHOCYTES NFR BLD AUTO: 38.3 %
MAN DIFF?: NORMAL
MCHC RBC-ENTMCNC: 27.1 PG
MCHC RBC-ENTMCNC: 31.2 GM/DL
MCV RBC AUTO: 87.1 FL
MICROSCOPIC-UA: NORMAL
MONOCYTES # BLD AUTO: 0.5 K/UL
MONOCYTES NFR BLD AUTO: 5.8 %
NEUTROPHILS # BLD AUTO: 4.54 K/UL
NEUTROPHILS NFR BLD AUTO: 53 %
NITRITE URINE: NEGATIVE
NONHDLC SERPL-MCNC: 132 MG/DL
PH URINE: 5.5
PLATELET # BLD AUTO: 335 K/UL
POTASSIUM SERPL-SCNC: 4 MMOL/L
PROT SERPL-MCNC: 7 G/DL
PROTEIN URINE: NEGATIVE MG/DL
RBC # BLD: 4.42 M/UL
RBC # FLD: 15.4 %
RED BLOOD CELLS URINE: 16 /HPF
REVIEW: NORMAL
SODIUM SERPL-SCNC: 137 MMOL/L
SPECIFIC GRAVITY URINE: >1.03
TRIGL SERPL-MCNC: 115 MG/DL
TSH SERPL-ACNC: 0.8 UIU/ML
UROBILINOGEN URINE: 0.2 MG/DL
VIT B12 SERPL-MCNC: 510 PG/ML
WBC # FLD AUTO: 8.58 K/UL
WHITE BLOOD CELLS URINE: 6 /HPF
YEAST-LIKE CELLS: PRESENT

## 2024-09-17 ENCOUNTER — RX CHANGE (OUTPATIENT)
Age: 49
End: 2024-09-17

## 2024-09-17 RX ORDER — MONTELUKAST 10 MG/1
10 TABLET, FILM COATED ORAL
Qty: 90 | Refills: 2 | Status: ACTIVE | COMMUNITY
Start: 1900-01-01 | End: 1900-01-01

## 2024-09-23 ENCOUNTER — NON-APPOINTMENT (OUTPATIENT)
Age: 49
End: 2024-09-23

## 2024-09-23 ENCOUNTER — TRANSCRIPTION ENCOUNTER (OUTPATIENT)
Age: 49
End: 2024-09-23

## 2024-09-24 NOTE — CONSULT LETTER
[FreeTextEntry1] : Lauren Mendelson FNP [FreeTextEntry2] : DM [FreeTextEntry3] : pt is a 48 yo female  with h/o T2DM  on MFN and ozempic found to have A1c 10.9%  Last  A1c was 8.0% in Aug 2023    PMH  T2DM since 2019  on MFNA nd ozempic  Bell's palsy  ASthma  PSH  none  FH  CAD father   Soc HX no alcohol  nonsomker  All  Aerobid Ceclor Zithromax  meds Albuterol Fluticasone Metformin 1000 mg Bid  ( ?tid on order)  Ozempic 2 mg qweek  Montelukast   Labs 	HGB A1C	10.9 %	H	4.0-5.6  	Sodium	137 mmol/L	 	135-145	  	Potassium	4.0 mmol/L	 	3.5-5.3	  	Chloride	101 mmol/L	 		  	CO2	25 mmol/L	 	22-31	  	Anion Gap, Serum	11 mmol/L	 	5-17	 	Glucose	284 mg/dL	H	70-99	  	BUN	9 mg/dL	 	7-23	  	Creatinine	0.59 mg/dL	 	0.50-1.30	  	Total Protein	7.0 g/dL	 	6.0-8.3	  	Albumin	3.9 g/dL	 	3.3-5.0	  	Calcium, Serum	9.2 mg/dL	 	8.4-10.5	  	Total Bilirubin	0.2 mg/dL	 	0.2-1.2	  	AST (SGOT)	27 U/L	 	10-40	  	ALT(SGPT)	34 U/L	 	10-45	  	ALK PHOS	75 U/L	 		  	eGFR	110 mL/min/1.73m2	 	>=60  	WBC	8.58 K/uL	 	3.80-10.50	  	RBC Count	4.42 M/uL	 	3.80-5.20	  	HGB	12.0 g/dL	 	11.5-15.5	  	HCT	38.5 %	 	34.5-45.0	  	Mean Cell Volume	87.1 fl	 	80.0-100.0	  	Mean Cell Hemoglobin	27.1 pg	 	27.0-34.0	 	Mean Cell Hemoglobin Conc	31.2 gm/dL	L	32.0-36.0	 	Red Cell Distrib Width	15.4 %	H	10.3-14.5	  	PLT	335 K/uL	 	150-400	  	Neutrophil #	4.54 K/uL	 	1.80-7.40	  	Lymphocyte #	3.29 K/uL	 	1.00-3.30	  	Monocyte #	0.50 K/uL	 	0.00-0.90	  	Eosinophil #	0.16 K/uL	 	0.00-0.50	  	Basophil #	0.08 K/uL	 	0.00-0.20	  	Neutrophil %	53.0 %	 	43.0-77.0	  	 Differential percentages must be correlated with absolute numbers for clinical significance.  	Lymphocyte %	38.3 %	 	13.0-44.0	  	Monocyte %	5.8 %	 	2.0-14.0	  	Eosinophil %	1.9 %	 	0.0-6.0	  	Basophil %	0.9 %	 	0.0-2.0	  	Auto Immature Granulocyte %	0.1 %	 	0.0-0.9	  	 (Includes meta, myelo and promyelocytes). Mild elevations in immature granulocytes may be seen with many inflammatory processes and pregnancy; clinical correlation suggested.  	MAN DIFF?	N/A	 	No [FreeTextEntry4] : Uncontrolled DM pt hjas been on ozempic  2mg qweek  MFN 1000 mg shoudl be bid  maximum  woudl start insulin LAntus 15 untis QHS  - would check C-peptide and GAD65 AB, islet cell ab Zinc transporter 8 AB  - willarrange for virtual [facility] visit for insulin teach   -will arange for pt tot be seen in office for in person followupw [facility]

## 2024-09-30 ENCOUNTER — APPOINTMENT (OUTPATIENT)
Dept: PULMONOLOGY | Facility: CLINIC | Age: 49
End: 2024-09-30
Payer: COMMERCIAL

## 2024-09-30 VITALS
WEIGHT: 199 LBS | RESPIRATION RATE: 16 BRPM | DIASTOLIC BLOOD PRESSURE: 66 MMHG | SYSTOLIC BLOOD PRESSURE: 128 MMHG | OXYGEN SATURATION: 97 % | HEIGHT: 59.5 IN | BODY MASS INDEX: 39.59 KG/M2 | HEART RATE: 96 BPM

## 2024-09-30 VITALS — WEIGHT: 199 LBS | HEIGHT: 59.5 IN | BODY MASS INDEX: 39.59 KG/M2

## 2024-09-30 DIAGNOSIS — J45.909 UNSPECIFIED ASTHMA, UNCOMPLICATED: ICD-10-CM

## 2024-09-30 PROCEDURE — 85018 HEMOGLOBIN: CPT | Mod: QW

## 2024-09-30 PROCEDURE — 94010 BREATHING CAPACITY TEST: CPT

## 2024-09-30 PROCEDURE — 99213 OFFICE O/P EST LOW 20 MIN: CPT | Mod: 25

## 2024-09-30 PROCEDURE — 94729 DIFFUSING CAPACITY: CPT

## 2024-09-30 PROCEDURE — 94727 GAS DIL/WSHOT DETER LNG VOL: CPT

## 2024-09-30 NOTE — ASSESSMENT
[FreeTextEntry1] : 49F PMH never a smoker, asthma, DM on Ozempic who presents for f/u visit.   - C/w Advair 500-50, Montelukast 10mg - PFT today showed FEV1/FVC 89%, FEV1 76%, FVC 72%, FEF 25-75 118%, %, TLC 66%, VCmax 65%, DLCOc 78%.  - Overall pt is feeling better - Lungs clear on exam - Will f/u routine visit in 4 mo time  The patient expressed understanding and agreement with the plan as outlined above and accepts responsibility to be compliant with any recommended testing, treatment, and follow-up visits.  All relevant questions and concerns were addressed.  25 minutes of time were spent on the encounter. Medical records were reviewed, including but not limited to hospital records, outpatient records, laboratory data, and diagnostic imaging studies. Greater than 50% of the face-to-face encounter time was spent on counseling and/or coordination of care.  Gilbert Burns MD, [facility] [place] [address] [place] [address] T: [phone] F: [phone]

## 2024-09-30 NOTE — HISTORY OF PRESENT ILLNESS
[Never] : never [TextBox_4] : 49F PMH never a smoker, asthma, DM on Ozempic who presents for f/u visit. Last visit Advair was increased to 500-50 and restarted on Montelukast. PFT today showed FEV1/FVC 89%, FEV1 76%, FVC 72%, FEF 25-75 118%, %, TLC 66%, VCmax 65%, DLCOc 78%. She overall feels better. She is using albuterol 2-3x/week. No fevers or chills, no chest tightness, no worsening cough.

## 2024-10-02 ENCOUNTER — RX RENEWAL (OUTPATIENT)
Age: 49
End: 2024-10-02

## 2024-10-28 ENCOUNTER — APPOINTMENT (OUTPATIENT)
Dept: ENDOCRINOLOGY | Facility: CLINIC | Age: 49
End: 2024-10-28
Payer: COMMERCIAL

## 2024-10-28 PROCEDURE — G0108 DIAB MANAGE TRN  PER INDIV: CPT

## 2024-10-28 RX ORDER — INSULIN GLARGINE 100 [IU]/ML
100 INJECTION, SOLUTION SUBCUTANEOUS
Qty: 1 | Refills: 1 | Status: ACTIVE | COMMUNITY
Start: 2024-10-28 | End: 1900-01-01

## 2024-10-28 RX ORDER — PEN NEEDLE, DIABETIC 29 G X1/2"
32G X 4 MM NEEDLE, DISPOSABLE MISCELLANEOUS
Qty: 1 | Refills: 1 | Status: ACTIVE | COMMUNITY
Start: 2024-10-28 | End: 1900-01-01

## 2024-11-07 ENCOUNTER — APPOINTMENT (OUTPATIENT)
Dept: ENDOCRINOLOGY | Facility: CLINIC | Age: 49
End: 2024-11-07
Payer: COMMERCIAL

## 2024-11-07 VITALS
HEART RATE: 86 BPM | DIASTOLIC BLOOD PRESSURE: 80 MMHG | SYSTOLIC BLOOD PRESSURE: 128 MMHG | OXYGEN SATURATION: 95 % | WEIGHT: 211 LBS | BODY MASS INDEX: 41.98 KG/M2 | HEIGHT: 59.5 IN

## 2024-11-07 DIAGNOSIS — E66.9 OBESITY, UNSPECIFIED: ICD-10-CM

## 2024-11-07 DIAGNOSIS — E53.8 DEFICIENCY OF OTHER SPECIFIED B GROUP VITAMINS: ICD-10-CM

## 2024-11-07 DIAGNOSIS — E11.9 TYPE 2 DIABETES MELLITUS W/OUT COMPLICATIONS: ICD-10-CM

## 2024-11-07 DIAGNOSIS — E78.5 HYPERLIPIDEMIA, UNSPECIFIED: ICD-10-CM

## 2024-11-07 DIAGNOSIS — Z79.84 LONG TERM (CURRENT) USE OF ORAL HYPOGLYCEMIC DRUGS: ICD-10-CM

## 2024-11-07 PROCEDURE — 99214 OFFICE O/P EST MOD 30 MIN: CPT

## 2024-12-09 ENCOUNTER — APPOINTMENT (OUTPATIENT)
Dept: ENDOCRINOLOGY | Facility: CLINIC | Age: 49
End: 2024-12-09

## 2025-01-20 ENCOUNTER — APPOINTMENT (OUTPATIENT)
Dept: ENDOCRINOLOGY | Facility: CLINIC | Age: 50
End: 2025-01-20

## 2025-02-13 ENCOUNTER — TRANSCRIPTION ENCOUNTER (OUTPATIENT)
Age: 50
End: 2025-02-13

## 2025-02-17 ENCOUNTER — TRANSCRIPTION ENCOUNTER (OUTPATIENT)
Age: 50
End: 2025-02-17